# Patient Record
Sex: FEMALE | Race: BLACK OR AFRICAN AMERICAN | Employment: UNEMPLOYED | URBAN - METROPOLITAN AREA
[De-identification: names, ages, dates, MRNs, and addresses within clinical notes are randomized per-mention and may not be internally consistent; named-entity substitution may affect disease eponyms.]

---

## 2017-03-17 LAB — PAP SMEAR: NORMAL

## 2018-01-30 ENCOUNTER — OFFICE VISIT (OUTPATIENT)
Dept: FAMILY MEDICINE CLINIC | Age: 33
End: 2018-01-30

## 2018-01-30 VITALS
RESPIRATION RATE: 16 BRPM | HEIGHT: 62 IN | BODY MASS INDEX: 37.17 KG/M2 | DIASTOLIC BLOOD PRESSURE: 80 MMHG | WEIGHT: 202 LBS | HEART RATE: 68 BPM | TEMPERATURE: 98.4 F | SYSTOLIC BLOOD PRESSURE: 118 MMHG

## 2018-01-30 DIAGNOSIS — R68.89 COLD INTOLERANCE: Primary | ICD-10-CM

## 2018-01-30 DIAGNOSIS — Z00.00 WELL ADULT HEALTH CHECK: ICD-10-CM

## 2018-01-30 DIAGNOSIS — G43.009 MIGRAINE WITHOUT AURA AND WITHOUT STATUS MIGRAINOSUS, NOT INTRACTABLE: ICD-10-CM

## 2018-01-30 DIAGNOSIS — H55.00 NYSTAGMUS: ICD-10-CM

## 2018-01-30 DIAGNOSIS — R63.5 WEIGHT GAIN: ICD-10-CM

## 2018-01-30 PROBLEM — R73.9 HYPERGLYCEMIA: Status: ACTIVE | Noted: 2018-01-30

## 2018-01-30 PROBLEM — R73.9 HYPERGLYCEMIA: Status: RESOLVED | Noted: 2018-01-30 | Resolved: 2018-01-30

## 2018-01-30 PROCEDURE — G8484 FLU IMMUNIZE NO ADMIN: HCPCS | Performed by: FAMILY MEDICINE

## 2018-01-30 PROCEDURE — 99395 PREV VISIT EST AGE 18-39: CPT | Performed by: FAMILY MEDICINE

## 2018-01-30 PROCEDURE — G8417 CALC BMI ABV UP PARAM F/U: HCPCS | Performed by: FAMILY MEDICINE

## 2018-01-30 PROCEDURE — 1036F TOBACCO NON-USER: CPT | Performed by: FAMILY MEDICINE

## 2018-01-30 PROCEDURE — G8427 DOCREV CUR MEDS BY ELIG CLIN: HCPCS | Performed by: FAMILY MEDICINE

## 2018-01-30 ASSESSMENT — PATIENT HEALTH QUESTIONNAIRE - PHQ9
SUM OF ALL RESPONSES TO PHQ9 QUESTIONS 1 & 2: 1
2. FEELING DOWN, DEPRESSED OR HOPELESS: 1
SUM OF ALL RESPONSES TO PHQ QUESTIONS 1-9: 1
1. LITTLE INTEREST OR PLEASURE IN DOING THINGS: 0

## 2018-01-30 NOTE — PROGRESS NOTES
08/28/2016    MCV 81.8 08/28/2016     08/28/2016     Lab Results   Component Value Date    ALT 11 08/27/2016    AST 11 (L) 08/27/2016    ALKPHOS 106 08/27/2016    BILITOT <0.2 08/27/2016     TSH (uIU/mL)   Date Value   04/29/2015 0.83     No results found for: LABA1C  No results found for: PSA, PSADIA  The ASCVD Risk score (Jackson Orellana., et al., 2013) failed to calculate for the following reasons: The 2013 ASCVD risk score is only valid for ages 36 to 78    PHYSICAL-VISIT NOTE   Subjective:     Chief Complaint   Patient presents with    Annual Exam       Johana Cedeño is a 29 y.o. female who presents for annual testing/preventive review and cproblems below:     5 months post-partum    Low back pain many years  Still R ankle pain, worse when get to standing , intermittent during day, accident 12/16, swells at times, no falls; tylenol and ibu no help so not taking    Cold intolerance, gradually worsening, weight gain since delivery  Menses regular    Spot on R trunk couple weeks, sore      Review of Systems  A comprehensive review of systems was negative with the EXCEPTION of notes above. See scanned in system review check-list.    Patient's medications, allergies, past medical, surgical, social and family histories were reviewed and updated as appropriate (see above). Objective:   PHYSICAL EXAM   /80 (Site: Left Arm, Position: Sitting, Cuff Size: Thigh)   Pulse 68   Temp 98.4 °F (36.9 °C) (Oral)   Resp 16   Ht 5' 2\" (1.575 m)   Wt 202 lb (91.6 kg)   LMP 01/13/2018   BMI 36.95 kg/m²   Blood pressure is Excellent. BP Readings from Last 5 Encounters:   01/30/18 118/80   09/13/16 (!) 147/94   09/01/16 152/90   12/24/15 101/67   09/22/15 128/79     Weight is increased.    Wt Readings from Last 5 Encounters:   01/30/18 202 lb (91.6 kg)   09/13/16 152 lb 1.9 oz (69 kg)   09/01/16 164 lb 3.9 oz (74.5 kg)   12/13/15 187 lb 6.3 oz (85 kg)   09/22/15 160 lb (72.6 kg)      GENERAL: · well-developed, well-nourished, alert, no distress. EYES:   · External findings: lids and lashes normal and conjunctivae and sclerae normal  · Eyes: no periorbital cellulitis. ENT:   · External nose and ears appear normal  · normal TM's and external ear canals both ears  · Pharynx: normal. Exudates: None  · Lips, mucosa, and tongue normal   · Hearing grossly normal.     NECK:   · No adenopathy, supple, symmetrical, trachea midline  · Thyroid not enlarged, symmetric, no tenderness/mass/nodules  LYMPH:  · no cervical nodes, no supraclavicular nodes  LUNGS:    · Breathing unlabored  · clear to auscultation bilaterally and good air movement  CARDIOVASC:   · regular rate and rhythm, S1, S2 normal. No murmur, click, rub or gallop  · Apical impulse normal  · LEGS:  Lower extremity edema: none    · No carotid bruits  ABDOMEN:   · Soft, non-tender, no masses  · No hepatosplenomegaly  · No hernias noted. Exam limited by N/A  SKIN: warm and dry  · No rashes or suspicious lesions  · R side with filiaform 5 mm lesion- wart vs keratoacanthoma  PSYCH:    · Alert and oriented  · Normal reasoning, insight good  · Facial expressions full, mood appropriate  · No memory disturbance noted  MUSCULOSKEL:    · Gait normal, assistive device: none  · No significant finger or nail findings  · Spine symmetric, no deformities, no kyphosis      Assessment and Plan:     1. Well adult health check     2. Normocytic anemia       RISK FACTORS AND COUNSELLING  · Behavioral Risks- At risk for nutritional issues. Counseling provided on the following healthy behaviors: nutrition. INSTRUCTIONS  · NEXT APPOINTMENT: soon to remove wart  · PLEASE TAKE THIS FORM TO CHECK-OUT WINDOW TO SCHEDULE NEXT VISIT.   · REFILL POLICY:  If not getting refills today, then PLEASE make next appointment on way out today. Will need to see that future appointment scheudled when pharmacy contacts Dr. Evon Burgos for a refill.   · PLEASE GET BLOODWORK DRAWN TODAY ON FIRST FLOOR in 170. Take orders with you. RESULTS- most blood tests back in couple days. We will call you if any problems. If bloodwork good, you will get letter in mail or notified thru [address] (if signed up) within 2 weeks. If you do not, please call office.    · See orthopedics if ankle gets worse  ·

## 2018-01-30 NOTE — PATIENT INSTRUCTIONS
loss medicines? Taking medicines may work better than diet and exercise alone for some people. OTC orlistat (brand: Osker Boas) can help weight loss. A multivitamin must be taken every day to prevent vitamin deficiencies. Many people regain weight after they stop taking these medicines. Should I have weight loss surgery? Surgery can help with long-term weight loss maintenance, BUT people who make major lifestyle changes can get the same results.      Look into cell phone thomas \"Lose it\" or online at [email or web address].

## 2018-02-14 ENCOUNTER — OFFICE VISIT (OUTPATIENT)
Dept: FAMILY MEDICINE CLINIC | Age: 33
End: 2018-02-14

## 2018-02-14 VITALS
WEIGHT: 202 LBS | HEART RATE: 88 BPM | BODY MASS INDEX: 37.17 KG/M2 | TEMPERATURE: 98.1 F | SYSTOLIC BLOOD PRESSURE: 110 MMHG | DIASTOLIC BLOOD PRESSURE: 70 MMHG | RESPIRATION RATE: 16 BRPM | HEIGHT: 62 IN

## 2018-02-14 DIAGNOSIS — D48.9 NEOPLASM OF UNCERTAIN BEHAVIOR: Primary | ICD-10-CM

## 2018-02-14 PROCEDURE — 11100 PR BIOPSY OF SKIN LESION: CPT | Performed by: FAMILY MEDICINE

## 2018-02-14 NOTE — PATIENT INSTRUCTIONS
Plan:  Keep covered for 24-48h and then clean and dry. Apply topical antibiotics for a few days. Call for any signs of infection including redness, swelling or increasing pain. May take OTC pain relivers if needed. Expect a call about results within 7 days. Please call if you do not hear anything by then.

## 2018-02-15 ENCOUNTER — TELEPHONE (OUTPATIENT)
Dept: FAMILY MEDICINE CLINIC | Age: 33
End: 2018-02-15

## 2018-02-15 NOTE — TELEPHONE ENCOUNTER
Debbie with [facility] lab called and needs to know the source of the biopsy that was ordered.     Please call

## 2018-03-01 ENCOUNTER — TELEPHONE (OUTPATIENT)
Dept: FAMILY MEDICINE CLINIC | Age: 33
End: 2018-03-01

## 2018-03-01 NOTE — TELEPHONE ENCOUNTER
Pt is calling to see if Dr. Monika Gastelum would prescribed Adepx for weight loss.      Please advise

## 2018-03-01 NOTE — TELEPHONE ENCOUNTER
Pt was very rude and stated that it doesn't matter, she's going to go buy it anyway, she just was hoping to get it cheaper with a prescription. She said she has a lot of kids and was hoping Dr. Aguila Eric would understand cost and hung up on me.

## 2019-12-09 ENCOUNTER — TELEPHONE (OUTPATIENT)
Dept: FAMILY MEDICINE CLINIC | Age: 34
End: 2019-12-09

## 2021-01-27 ENCOUNTER — TELEPHONE (OUTPATIENT)
Dept: FAMILY MEDICINE CLINIC | Age: 36
End: 2021-01-27

## 2021-03-11 ENCOUNTER — OFFICE VISIT (OUTPATIENT)
Dept: FAMILY MEDICINE CLINIC | Age: 36
End: 2021-03-11
Payer: COMMERCIAL

## 2021-03-11 VITALS
WEIGHT: 154.38 LBS | TEMPERATURE: 97.3 F | RESPIRATION RATE: 16 BRPM | DIASTOLIC BLOOD PRESSURE: 70 MMHG | OXYGEN SATURATION: 98 % | HEIGHT: 62 IN | HEART RATE: 75 BPM | SYSTOLIC BLOOD PRESSURE: 124 MMHG | BODY MASS INDEX: 28.41 KG/M2

## 2021-03-11 DIAGNOSIS — L50.9 URTICARIA: ICD-10-CM

## 2021-03-11 DIAGNOSIS — G43.009 MIGRAINE WITHOUT AURA AND WITHOUT STATUS MIGRAINOSUS, NOT INTRACTABLE: ICD-10-CM

## 2021-03-11 DIAGNOSIS — Z00.00 WELL ADULT HEALTH CHECK: Primary | ICD-10-CM

## 2021-03-11 PROCEDURE — 96372 THER/PROPH/DIAG INJ SC/IM: CPT | Performed by: FAMILY MEDICINE

## 2021-03-11 PROCEDURE — 99395 PREV VISIT EST AGE 18-39: CPT | Performed by: FAMILY MEDICINE

## 2021-03-11 PROCEDURE — G8484 FLU IMMUNIZE NO ADMIN: HCPCS | Performed by: FAMILY MEDICINE

## 2021-03-11 RX ORDER — DIPHENHYDRAMINE HYDROCHLORIDE 50 MG/ML
25 INJECTION INTRAMUSCULAR; INTRAVENOUS ONCE
Status: COMPLETED | OUTPATIENT
Start: 2021-03-11 | End: 2021-03-11

## 2021-03-11 RX ORDER — PREDNISOLONE SODIUM PHOSPHATE 5 MG/5ML
10 SOLUTION ORAL 3 TIMES DAILY
Qty: 60 ML | Refills: 0 | Status: SHIPPED | OUTPATIENT
Start: 2021-03-11 | End: 2021-03-13

## 2021-03-11 RX ADMIN — DIPHENHYDRAMINE HYDROCHLORIDE 25 MG: 50 INJECTION INTRAMUSCULAR; INTRAVENOUS at 15:32

## 2021-03-11 SDOH — ECONOMIC STABILITY: INCOME INSECURITY: HOW HARD IS IT FOR YOU TO PAY FOR THE VERY BASICS LIKE FOOD, HOUSING, MEDICAL CARE, AND HEATING?: NOT HARD AT ALL

## 2021-03-11 SDOH — ECONOMIC STABILITY: TRANSPORTATION INSECURITY
IN THE PAST 12 MONTHS, HAS LACK OF TRANSPORTATION KEPT YOU FROM MEETINGS, WORK, OR FROM GETTING THINGS NEEDED FOR DAILY LIVING?: NO

## 2021-03-11 SDOH — ECONOMIC STABILITY: TRANSPORTATION INSECURITY
IN THE PAST 12 MONTHS, HAS THE LACK OF TRANSPORTATION KEPT YOU FROM MEDICAL APPOINTMENTS OR FROM GETTING MEDICATIONS?: NO

## 2021-03-11 ASSESSMENT — PATIENT HEALTH QUESTIONNAIRE - PHQ9
2. FEELING DOWN, DEPRESSED OR HOPELESS: 0
1. LITTLE INTEREST OR PLEASURE IN DOING THINGS: 0

## 2021-03-11 NOTE — LETTER
[facility]  [address] [place] [address]  Phone: [phone]  Fax: [phone]    Queen Mari MD        March 11, 2021     Patient: Ellis Jovel   YOB: 1985   Date of Visit: 3/11/2021       To Whom It May Concern:    Ellis Jovel was seen in our office today 03/11/2021    If you have any questions or concerns, please don't hesitate to call.     Sincerely,          Queen Mari MD

## 2021-03-11 NOTE — PROGRESS NOTES
PHYSICAL-VISIT NOTE   Subjective:     Chief Complaint   Patient presents with    Annual Exam       Howard Ferrer is a 37 y.o. female who presents for annual testing/preventive review and check-up of medical problems listed below:  1. Well adult health check    2. Urticaria    3. Migraine without aura and without status migrainosus, not intractable       Complaints: forearms and elbows itchy and bumpy since came into exam room. No new exposures. Had similar 10 yrs ago all over body with no known trigger. None since. Denies lip/throat swelling or itch. · Trouble with vision worsening. · Migraines every few days the last couple weeks. Better with ibu and sleep. Positive photophobia. Denies N. Lasts couple hours. ROS  See scanned \"Annual Adult Health Checklist\". Pertinent positives addressed above. HISTORY:  Patient's medications, allergies, past medical, and social histories were reviewed and updated as appropriate. CHART REVIEW  Health Maintenance   Topic Date Due    Cervical cancer screen  03/17/2020    Flu vaccine (1) 06/30/2021 (Originally 9/1/2020)    DTaP/Tdap/Td vaccine (5 - Td) 05/02/2029    HIV screen  Completed    Hepatitis A vaccine  Aged Out    Hepatitis B vaccine  Aged Out    Hib vaccine  Aged Out    Meningococcal (ACWY) vaccine  Aged Out    Pneumococcal 0-64 years Vaccine  Aged Out    Varicella vaccine  Discontinued    Hepatitis C screen  Discontinued     The ASCVD Risk score (La Wiley., et al., 2013) failed to calculate for the following reasons: The 2013 ASCVD risk score is only valid for ages 36 to 78  Prior to Visit Medications    Medication Sig Taking? Authorizing Provider   prednisoLONE sodium phosphate (PEDIAPRED) 6.7 (5 Base) MG/5ML SOLN solution Take 10 mLs by mouth 3 times daily for 2 days Yes Lisa Hebert MD      Family History   Problem Relation Age of Onset    Diabetes Father     Migraines Father     Cancer Maternal Aunt         ??      Social History     Tobacco Use    Smoking status: Never Smoker    Smokeless tobacco: Never Used    Tobacco comment: congrats   Substance Use Topics    Alcohol use: Yes     Alcohol/week: 2.0 standard drinks     Types: 2 Standard drinks or equivalent per week    Drug use: No      LAST LABS  Cholesterol, Total   Date Value Ref Range Status   04/29/2015 202 (H) 0 - 199 mg/dL Final     LDL Calculated   Date Value Ref Range Status   04/29/2015 108 (H) <100 mg/dL Final     HDL   Date Value Ref Range Status   04/29/2015 81 (H) 40 - 60 mg/dL Final     Triglycerides   Date Value Ref Range Status   04/29/2015 67 0 - 150 mg/dL Final     Lab Results   Component Value Date    GLUCOSE 132 (H) 08/30/2016     Lab Results   Component Value Date     08/30/2016    K 4.1 08/30/2016    CREATININE 0.6 08/30/2016     Lab Results   Component Value Date    WBC 10.6 08/28/2016    HGB 11.6 (L) 08/28/2016    HCT 36.1 08/28/2016    MCV 81.8 08/28/2016     08/28/2016     Lab Results   Component Value Date    ALT 11 08/27/2016    AST 11 (L) 08/27/2016    ALKPHOS 106 08/27/2016    BILITOT <0.2 08/27/2016     TSH (uIU/mL)   Date Value   04/29/2015 0.83     No results found for: LABA1C  Objective:   PHYSICAL EXAM   /70 (Site: Right Upper Arm, Position: Sitting, Cuff Size: Medium Adult)   Pulse 75   Temp 97.3 °F (36.3 °C) (Temporal)   Resp 16   Ht 5' 1.5\" (1.562 m)   Wt 154 lb 6 oz (70 kg)   SpO2 98%   BMI 28.70 kg/m²   BP Readings from Last 5 Encounters:   03/11/21 124/70   02/14/18 110/70   01/30/18 118/80   09/13/16 (!) 147/94   09/01/16 152/90     Wt Readings from Last 5 Encounters:   03/11/21 154 lb 6 oz (70 kg)   02/14/18 202 lb (91.6 kg)   01/30/18 202 lb (91.6 kg)   09/13/16 152 lb 1.9 oz (69 kg)   09/01/16 164 lb 3.9 oz (74.5 kg)      GENERAL:   · well-developed, well-nourished, alert, no distress.      EYES:   · External findings: lids and lashes normal and conjunctivae and sclerae normal  · Eyes: no periorbital cellulitis. ENT:   · External nose and ears appear normal  · normal TM's and external ear canals both ears  · Pharynx: normal. Exudates: None  · Lips, mucosa, and tongue normal  · Hearing grossly normal.     NECK:   · Supple, symmetrical, trachea midline  · Thyroid not enlarged, symmetric, no tenderness/mass/nodules  LYMPH:  · no cervical nodes, no supraclavicular nodes  LUNGS:    · Breathing unlabored  · clear to auscultation bilaterally and good air movement  CARDIOVASC:   · regular rate and rhythm, S1, S2 normal. No murmur, click, rub or gallop  · Apical impulse normal  · LEGS:  Lower extremity edema: none    ABDOMEN:   · Soft, non-tender, no masses  · No hepatosplenomegaly  · No hernias noted. Exam limited by N/A  SKIN: warm and dry  · No rashes or suspicious lesions  · No nodules or induration  PSYCH:    · Alert and oriented  · Normal reasoning, insight good  · Facial expressions full, mood appropriate  · No memory disturbance noted  MUSCULOSKEL:    · Gait normal, assistive device: none  · No significant finger or nail findings  · Spine symmetric, no deformities, no kyphosis      Assessment and Plan:      Diagnosis Orders   1. Well adult health check  Lipid Panel    Glucose   2. Urticaria  diphenhydrAMINE (BENADRYL) injection 25 mg    prednisoLONE sodium phosphate (PEDIAPRED) 6.7 (5 Base) MG/5ML SOLN solution   3. Migraine without aura and without status migrainosus, not intractable     Plan as above and below. Given benadryl 25 mg IM x 1    INSTRUCTIONS  · NEXT APPOINTMENT: Please schedule annual complete physical (30 minutes) in one year. · PLEASE TAKE THIS FORM TO CHECK-OUT WINDOW TO SCHEDULE NEXT VISIT. PLEASE GET FASTING BLOODWORK DRAWN SOON. Lab is on first floor in [address]. Hours Monday to Friday 7 AM to 5 PM.   · See eye doctor soon. · Take prednisone for 2 days. · Get some benadryl at pharmacy. · Get more sleep. · Ask GYN to fax PAP result to Dr. Nile Ordaz at [phone].   · See me 1-2 months if migraines not settling down.

## 2021-03-11 NOTE — PATIENT INSTRUCTIONS
INSTRUCTIONS  · NEXT APPOINTMENT: Please schedule annual complete physical (30 minutes) in one year. · PLEASE TAKE THIS FORM TO CHECK-OUT WINDOW TO SCHEDULE NEXT VISIT. PLEASE GET FASTING BLOODWORK DRAWN SOON. Lab is on first floor in [address]. Hours Monday to Friday 7 AM to 5 PM.   · See eye doctor soon. · Take prednisone for 2 days. · Get some benadryl at pharmacy. · Get more sleep. · Ask GYN to fax PAP result to Dr. Nile Ordaz at [phone]. · See me 1-2 months if migraines not settling down. Patient Education       MIGRAINES    What is a migraine headache? A migraine is usually an intense pounding headache that can last for 2 hours or even up to 2 or 3 days. The pounding or pulsing pain usually begins in the forehead, the side of the head or around the eyes. The headache gradually gets worse. Just about any movement, activity, bright lights or loud noises seem to make it hurt more. Nausea and vomiting are common. Some people see a pattern of lines or shadows in front of their eyes as the headache is beginning. This is called a \"warning aura. \" Most people who have migraine headaches do not have this. Migraines may happen only once or twice a year, or as often as daily. Women are more likely to have migraines than men. As many as 5% of children in grade school have migraine headaches. More common in girls than in boys. Classic migraines start with a warning sign, called an aura. The aura often involves changes in the way you see. You may see flashing lights and colors. You may temporarily lose some of your vision, such as your side vision. You may also feel a strange prickly or burning sensation, or have muscle weakness on one side of your body. You may have trouble communicating. You may also feel depressed, irritable and restless. Auras last about 15 to 30 minutes. Auras may occur before or after your head pain, and sometimes the pain and aura overlap, or the pain never occurs.  The head pain of classic migraines may occur on one side of your head or on both sides. Common migraines don't start with an aura. Common migraines may start more slowly than classic migraines, last longer and interfere more with daily activities. The pain of common migraines may be on only one side of your head. Symptoms   Pain can be intense. It can get in the way of your daily activities. Migraines aren't the same for all people. Possible symptoms include:  intense throbbing or dull aching pain on one side of your head or both sides, worsens with physical activity, nausea or vomiting, blurred vision or blind spots, sensitivity to light, noise or odors, feeling tired and/or confused, stopped-up nose, feeling cold or sweaty, stiff or tender neck, lightheadedness, tender scalp. You may have a \"premonition\" several hours to a day before your headache starts. Premonitions are feelings you get that can signal a migraine is coming. These feelings can include intense energy, fatigue, food cravings, thirst and mood changes. What causes migraines? Migraine headaches seem to be caused in part by changes in the level of a body chemical called serotonin. When serotonin levels fall, the blood vessels dilate (swell). This swelling can cause pain or other problems. What can set off a migraine? Things that can set off migraines include the following:  odors, bright lights or loud noises, changes in weather or altitude, feeling tired/stressed/depressed, changes in sleeping patterns,missing meals or fasting, menstrual periods or hormonal changes, intense physical activity, smoking, fumes. Many foods can trigger migraines such as:   processed meats, aged cheese, red wine, aspartame, beans, yeast, caffeine in excess, chocolate, cocoa and carob, MSG, nuts, papaya, avocado, pickled foods, raisens, sauerkraut, soy sauce, etc     Diagnosis & Tests   How is migraine diagnosed?   Your doctor can diagnose migraines by the symptoms you describe. Your doctor will perform a physical exam. Your doctor may also want to do blood tests or imaging tests, such as an MRI or CAT scan of the brain, to be sure that there are no other causes for the headache. You may also be asked to keep a \"headache diary\" to help your doctor identify the things that might cause your migraines. Treatment   How are migraines treated? There are 2 types of medicines for migraine treatments. One type focuses on relieving the headache pain. This type of treatment should be started as soon as you think you're getting a migraine. The other type includes medicines that are used to prevent headaches before they occur. Can nonprescription medicines help relieve the pain? Yes. Nonprescription medicines that can help relieve migraine pain include aspirin; acetaminophen (one brand name: Tylenol); an acetaminophen, aspirin and caffeine combination (one brand name: Excedrin Migraine); ibuprofen (one brand name: Motrin); naproxen (brand name: Aleve); and ketoprofen (brand name: Orudis KT). What about prescription medicines? People who have more severe pain may need prescription medicine. A medicine called ergotamine can be effective alone or combined with other medicines. Dihydroergotamine is related to ergotamine and can be helpful. Other prescription medicines for migraines include sumatriptan, zolmitriptan, naratriptan, rizatriptan, almotriptan, eletriptan and frovatriptan. If the pain won't go away, stronger medicine may be needed, such as a narcotic, or medicines that contain a barbiturate. These medicines can be habit-forming and should be used cautiously. Tips on reducing the pain  Lie down in a dark, quiet room. Put a cold compress or cloth over your forehead. Massage your scalp using a lot of pressure. Put pressure on your temples. Prevention   Can medicine help prevent migraines? Yes.  Medicine to prevent migraines may be helpful if your headaches happen more than twice a month or if your headaches make it hard for you to work and function. Examples of medicines used to prevent migraines include propranolol, timolol, divalproex and some antidepressants. What else can I do to prevent migraines? While there are no sure ways to keep from having migraine headaches, here are some things that may help:  Eat regularly and do not skip meals. Keep a regular sleep schedule. Exercise regularly. Aerobic exercise can help reduce tension as well as keep your weight in check. Obesity can contribute to migraines. Look for things that might trigger an attack, such as too much exercise or physical activity, certain activities or stress. Sometimes, life stresses are a trigger. Many psychologists can teach stress management and/or biofeedback to help you manage stress. Look for foods that might trigger an attack, such as cheese, processed meats, chocolate, caffeine, MSG (a preservative in many foods), nuts, pickles or one of the other foods listed above. About one third of people with migraines can identify food triggers. Your child only needs to avoid eating these foods if one of them triggers headaches. If you have frequent migraine headaches, your doctor may prescribe a daily preventive medicine to try to make the headaches less frequent and less severe.

## 2021-08-31 ENCOUNTER — TELEPHONE (OUTPATIENT)
Dept: FAMILY MEDICINE CLINIC | Age: 36
End: 2021-08-31

## 2021-08-31 NOTE — TELEPHONE ENCOUNTER
----- Message from April Arti sent at 8/31/2021 12:16 PM EDT -----  Subject: Message to Provider    QUESTIONS  Information for Provider? patient needs FMLA paperwork filled out. she was   inquiring if her primary Girard Harm can fill this out, or if she should   go to her surgeon to have surgeon fill it out. please call patient to   advise. per patient she needs to know as soon as possible if this can be   done.   ---------------------------------------------------------------------------  --------------  CALL BACK INFO  What is the best way for the office to contact you? OK to leave message on   voicemail  Preferred Call Back Phone Number? [phone]  ---------------------------------------------------------------------------  --------------  SCRIPT ANSWERS  Relationship to Patient?  Self

## 2021-08-31 NOTE — TELEPHONE ENCOUNTER
Pt calling back. Stated that the [facility] paperwork would need done by 9.2.2021 for her job from having surgery. Advised that it was noted if it is due to surgery the surgeon would need to fill out [facility] paperwork.

## 2022-08-25 ENCOUNTER — APPOINTMENT (OUTPATIENT)
Dept: GENERAL RADIOLOGY | Age: 37
End: 2022-08-25
Payer: COMMERCIAL

## 2022-08-25 ENCOUNTER — APPOINTMENT (OUTPATIENT)
Dept: MRI IMAGING | Age: 37
End: 2022-08-25
Payer: COMMERCIAL

## 2022-08-25 ENCOUNTER — APPOINTMENT (OUTPATIENT)
Dept: CT IMAGING | Age: 37
End: 2022-08-25
Payer: COMMERCIAL

## 2022-08-25 ENCOUNTER — HOSPITAL ENCOUNTER (OUTPATIENT)
Age: 37
Setting detail: OBSERVATION
Discharge: HOME | End: 2022-08-26
Attending: EMERGENCY MEDICINE | Admitting: FAMILY MEDICINE
Payer: COMMERCIAL

## 2022-08-25 DIAGNOSIS — R20.0 RIGHT SIDED NUMBNESS: Primary | ICD-10-CM

## 2022-08-25 PROBLEM — R20.2 PARESTHESIAS: Status: ACTIVE | Noted: 2022-08-25

## 2022-08-25 LAB
A/G RATIO: 1.4 (ref 1.1–2.2)
ALBUMIN SERPL-MCNC: 4.2 G/DL (ref 3.4–5)
ALP BLD-CCNC: 77 U/L (ref 40–129)
ALT SERPL-CCNC: 15 U/L (ref 10–40)
ANION GAP SERPL CALCULATED.3IONS-SCNC: 11 MMOL/L (ref 3–16)
AST SERPL-CCNC: 16 U/L (ref 15–37)
BACTERIA: ABNORMAL /HPF
BASOPHILS ABSOLUTE: 0.1 K/UL (ref 0–0.2)
BASOPHILS RELATIVE PERCENT: 0.7 %
BILIRUB SERPL-MCNC: 0.3 MG/DL (ref 0–1)
BILIRUBIN URINE: NEGATIVE
BLOOD, URINE: NEGATIVE
BUN BLDV-MCNC: 9 MG/DL (ref 7–20)
CALCIUM SERPL-MCNC: 9 MG/DL (ref 8.3–10.6)
CHLORIDE BLD-SCNC: 102 MMOL/L (ref 99–110)
CLARITY: ABNORMAL
CO2: 24 MMOL/L (ref 21–32)
COLOR: YELLOW
CREAT SERPL-MCNC: 0.6 MG/DL (ref 0.6–1.1)
D DIMER: <0.27 UG/ML FEU (ref 0–0.6)
EKG ATRIAL RATE: 66 BPM
EKG DIAGNOSIS: NORMAL
EKG P AXIS: 54 DEGREES
EKG P-R INTERVAL: 160 MS
EKG Q-T INTERVAL: 408 MS
EKG QRS DURATION: 84 MS
EKG QTC CALCULATION (BAZETT): 427 MS
EKG R AXIS: 63 DEGREES
EKG T AXIS: 42 DEGREES
EKG VENTRICULAR RATE: 66 BPM
EOSINOPHILS ABSOLUTE: 0.5 K/UL (ref 0–0.6)
EOSINOPHILS RELATIVE PERCENT: 6.2 %
EPITHELIAL CELLS, UA: 7 /HPF (ref 0–5)
GFR AFRICAN AMERICAN: >60
GFR NON-AFRICAN AMERICAN: >60
GLUCOSE BLD-MCNC: 87 MG/DL (ref 70–99)
GLUCOSE URINE: NEGATIVE MG/DL
HCG QUALITATIVE: NEGATIVE
HCT VFR BLD CALC: 37.4 % (ref 36–48)
HEMOGLOBIN: 12.6 G/DL (ref 12–16)
HYALINE CASTS: 0 /LPF (ref 0–8)
INR BLD: 0.98 (ref 0.87–1.14)
KETONES, URINE: ABNORMAL MG/DL
LEUKOCYTE ESTERASE, URINE: ABNORMAL
LYMPHOCYTES ABSOLUTE: 2.9 K/UL (ref 1–5.1)
LYMPHOCYTES RELATIVE PERCENT: 33.9 %
MAGNESIUM: 1.8 MG/DL (ref 1.8–2.4)
MCH RBC QN AUTO: 29.4 PG (ref 26–34)
MCHC RBC AUTO-ENTMCNC: 33.8 G/DL (ref 31–36)
MCV RBC AUTO: 87.2 FL (ref 80–100)
MICROSCOPIC EXAMINATION: YES
MONOCYTES ABSOLUTE: 0.8 K/UL (ref 0–1.3)
MONOCYTES RELATIVE PERCENT: 9.8 %
NEUTROPHILS ABSOLUTE: 4.2 K/UL (ref 1.7–7.7)
NEUTROPHILS RELATIVE PERCENT: 49.4 %
NITRITE, URINE: NEGATIVE
PDW BLD-RTO: 13.2 % (ref 12.4–15.4)
PH UA: 7 (ref 5–8)
PLATELET # BLD: 376 K/UL (ref 135–450)
PMV BLD AUTO: 7.6 FL (ref 5–10.5)
POTASSIUM REFLEX MAGNESIUM: 3.5 MMOL/L (ref 3.5–5.1)
PROTEIN UA: NEGATIVE MG/DL
PROTHROMBIN TIME: 12.9 SEC (ref 11.7–14.5)
RBC # BLD: 4.29 M/UL (ref 4–5.2)
RBC UA: 2 /HPF (ref 0–4)
SODIUM BLD-SCNC: 137 MMOL/L (ref 136–145)
SPECIFIC GRAVITY UA: 1.03 (ref 1–1.03)
TOTAL PROTEIN: 7.2 G/DL (ref 6.4–8.2)
TROPONIN: <0.01 NG/ML
URINE REFLEX TO CULTURE: YES
URINE TYPE: ABNORMAL
UROBILINOGEN, URINE: 1 E.U./DL
WBC # BLD: 8.4 K/UL (ref 4–11)
WBC UA: 23 /HPF (ref 0–5)

## 2022-08-25 PROCEDURE — 83735 ASSAY OF MAGNESIUM: CPT

## 2022-08-25 PROCEDURE — 6360000002 HC RX W HCPCS: Performed by: FAMILY MEDICINE

## 2022-08-25 PROCEDURE — 81001 URINALYSIS AUTO W/SCOPE: CPT

## 2022-08-25 PROCEDURE — 6370000000 HC RX 637 (ALT 250 FOR IP): Performed by: FAMILY MEDICINE

## 2022-08-25 PROCEDURE — 99285 EMERGENCY DEPT VISIT HI MDM: CPT

## 2022-08-25 PROCEDURE — 6360000004 HC RX CONTRAST MEDICATION: Performed by: FAMILY MEDICINE

## 2022-08-25 PROCEDURE — A9577 INJ MULTIHANCE: HCPCS | Performed by: FAMILY MEDICINE

## 2022-08-25 PROCEDURE — 87086 URINE CULTURE/COLONY COUNT: CPT

## 2022-08-25 PROCEDURE — G0378 HOSPITAL OBSERVATION PER HR: HCPCS

## 2022-08-25 PROCEDURE — 85610 PROTHROMBIN TIME: CPT

## 2022-08-25 PROCEDURE — 84703 CHORIONIC GONADOTROPIN ASSAY: CPT

## 2022-08-25 PROCEDURE — 93005 ELECTROCARDIOGRAM TRACING: CPT | Performed by: EMERGENCY MEDICINE

## 2022-08-25 PROCEDURE — 84484 ASSAY OF TROPONIN QUANT: CPT

## 2022-08-25 PROCEDURE — 70551 MRI BRAIN STEM W/O DYE: CPT

## 2022-08-25 PROCEDURE — 85379 FIBRIN DEGRADATION QUANT: CPT

## 2022-08-25 PROCEDURE — 72156 MRI NECK SPINE W/O & W/DYE: CPT

## 2022-08-25 PROCEDURE — 85025 COMPLETE CBC W/AUTO DIFF WBC: CPT

## 2022-08-25 PROCEDURE — 6360000004 HC RX CONTRAST MEDICATION: Performed by: EMERGENCY MEDICINE

## 2022-08-25 PROCEDURE — 70496 CT ANGIOGRAPHY HEAD: CPT

## 2022-08-25 PROCEDURE — 93010 ELECTROCARDIOGRAM REPORT: CPT | Performed by: INTERNAL MEDICINE

## 2022-08-25 PROCEDURE — 96372 THER/PROPH/DIAG INJ SC/IM: CPT

## 2022-08-25 PROCEDURE — 70450 CT HEAD/BRAIN W/O DYE: CPT

## 2022-08-25 PROCEDURE — 80053 COMPREHEN METABOLIC PANEL: CPT

## 2022-08-25 PROCEDURE — 92610 EVALUATE SWALLOWING FUNCTION: CPT

## 2022-08-25 PROCEDURE — 71045 X-RAY EXAM CHEST 1 VIEW: CPT

## 2022-08-25 RX ORDER — ATORVASTATIN CALCIUM 80 MG/1
80 TABLET, FILM COATED ORAL NIGHTLY
Status: DISCONTINUED | OUTPATIENT
Start: 2022-08-25 | End: 2022-08-26 | Stop reason: HOSPADM

## 2022-08-25 RX ORDER — ACETAMINOPHEN 500 MG
500 TABLET ORAL EVERY 6 HOURS PRN
COMMUNITY

## 2022-08-25 RX ORDER — ONDANSETRON 4 MG/1
4 TABLET, ORALLY DISINTEGRATING ORAL EVERY 8 HOURS PRN
Status: DISCONTINUED | OUTPATIENT
Start: 2022-08-25 | End: 2022-08-26 | Stop reason: HOSPADM

## 2022-08-25 RX ORDER — POLYETHYLENE GLYCOL 3350 17 G/17G
17 POWDER, FOR SOLUTION ORAL DAILY PRN
Status: DISCONTINUED | OUTPATIENT
Start: 2022-08-25 | End: 2022-08-26 | Stop reason: HOSPADM

## 2022-08-25 RX ORDER — NAPROXEN SODIUM 220 MG
220 TABLET ORAL 2 TIMES DAILY PRN
COMMUNITY
End: 2022-10-07

## 2022-08-25 RX ORDER — ASPIRIN 300 MG/1
300 SUPPOSITORY RECTAL DAILY
Status: DISCONTINUED | OUTPATIENT
Start: 2022-08-25 | End: 2022-08-26 | Stop reason: HOSPADM

## 2022-08-25 RX ORDER — ENOXAPARIN SODIUM 100 MG/ML
40 INJECTION SUBCUTANEOUS EVERY EVENING
Status: DISCONTINUED | OUTPATIENT
Start: 2022-08-25 | End: 2022-08-26 | Stop reason: HOSPADM

## 2022-08-25 RX ORDER — ASPIRIN 81 MG/1
81 TABLET ORAL DAILY
Status: DISCONTINUED | OUTPATIENT
Start: 2022-08-25 | End: 2022-08-26 | Stop reason: HOSPADM

## 2022-08-25 RX ORDER — ONDANSETRON 2 MG/ML
4 INJECTION INTRAMUSCULAR; INTRAVENOUS EVERY 6 HOURS PRN
Status: DISCONTINUED | OUTPATIENT
Start: 2022-08-25 | End: 2022-08-26 | Stop reason: HOSPADM

## 2022-08-25 RX ORDER — ACETAMINOPHEN, ASPIRIN AND CAFFEINE 250; 250; 65 MG/1; MG/1; MG/1
1 TABLET, FILM COATED ORAL EVERY 6 HOURS PRN
COMMUNITY

## 2022-08-25 RX ADMIN — IOPAMIDOL 75 ML: 755 INJECTION, SOLUTION INTRAVENOUS at 05:04

## 2022-08-25 RX ADMIN — ENOXAPARIN SODIUM 40 MG: 100 INJECTION SUBCUTANEOUS at 18:17

## 2022-08-25 RX ADMIN — ASPIRIN 81 MG: 81 TABLET, COATED ORAL at 14:07

## 2022-08-25 RX ADMIN — GADOBENATE DIMEGLUMINE 14 ML: 529 INJECTION, SOLUTION INTRAVENOUS at 15:29

## 2022-08-25 RX ADMIN — ATORVASTATIN CALCIUM 80 MG: 80 TABLET, FILM COATED ORAL at 21:33

## 2022-08-25 ASSESSMENT — PAIN SCALES - GENERAL
PAINLEVEL_OUTOF10: 0
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
## 2022-08-25 NOTE — ED NOTES
ED SBAR report provider to [facility]. Patient to be transported to Room 5257 via stretcher by ED tech. Patient transported with bedside cardiac monitor and with IV medications infusing. IV site clean, dry, and intact. MEWS score and pain assessed and documented. Updated patient on plan of care.      Loreto Noonan RN  08/25/22 5594
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
## 2022-08-25 NOTE — PROGRESS NOTES
Speech Language Pathology    Attempted to see patient for evaluation. Patient currently with neuro. Will re-attempt as schedule permits.     Fany Price, [facility], [address] [place] [address]  Speech-Language Pathologist  On 08/25/22 at 1:18 PM
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
## 2022-08-25 NOTE — PROGRESS NOTES
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     CT head 8/25/22  Impression   1. No acute hemorrhage. 2. Minimal white matter low attenuation in an area of the right posterior   parietal lobe, better seen on previous MR imaging in 2016. The extent of   white matter findings are better appreciated on MR imaging than can be seen   on CT. CHEST X-RAY 8/25/22  Impression   Clear chest without acute cardiopulmonary process. Modified Barium Swallow Study: any recent history in chart review: None in EMR    Reason for referral: SLP evaluation orders received due to CVA protocol     History/Prior Level of Function:   Living Status: Lives with kids  Baseline diet: Regular, thin liquids  Prior Dysphagia History: Denies, none in EMR    Dysphagia Impressions/Diagnosis: Oropharyngeal Dysphagia   OROPHARYNGEAL DYSPHAGIA DIAGNOSIS: WFL  Patient accepted and tolerated evaluation at bedside  Patient oriented x4, able to follow complex dx and verbally responsive. Pt denies cognitive decline  Patient presents with swallow function WFL, no dysphagia noted. Adequate mastication, clearance of bolus, timely swallow initiation, adequate laryngeal elevation and tolerated no overt s/s of aspiration or penetration with all trials presented  Recommend cont reg/thin liquids  ST to sign off at this time, no further ST warranted. Please re-consult if future need arises    Recommended Diet and Intervention 8/25/2022:  Diet Solids Recommendation:  Regular texture diet  Liquid Consistency Recommendation: Thin liquids  Recommended form of Meds: Whole with water     Dysphagia Prognosis: [x] good []fair  []guarded []poor     Discharge Recommendations: No further follow-up appears indicated at this time. TEST DATA  Vision: [facility]  Hearing: WFL    Consistencies Presented:    Thin liquid;   Soft/bite size food  Regular solid food    Cognitive/behavioral:   []orientation [x] to self [x] place [x] date [x] reason for admit [x] purpose of evaluation  []distractible  []verbally responsive  []follows one step commands  []agitated  []impulsive  [] other:       Patient Positioning: Upright in bed     Dentition:  [x]Adequate  []Dentures   []Missing Many Teeth  []Edentulous  []Other:    Baseline Vocal Quality:  [x]Normal  []Dysphonic   []Aphonic   []Hoarse  []Wet  []Weak  []Other:    Volitional Cough:  []Strong  []Weak  []Wet  []Absent  []Congested  []Other:    Volitional Swallow:   []Absent   []Delayed     [x]Adequate     []Required use of drink     Oral Mechanism Exam:  [x]WFL []Mild   [] Moderate  []Severe  []To be assessed  Impaired:   []Left side      []Right side    []Labial ROM/Coordination    []Labial Symmetry   []Lingual ROM/Coordination   []Lingual Symmetry  []Gag  []Other:     Oral Phase: [x]WFL []Mild   [] Moderate  []Severe  []To be assessed   []Impaired/Prolonged Mastication:   []Spillage Left:   []Spillage Right:  []Pocketing Left:   []Pocketing Right:   []Decreased Anterior to Posterior Transit:   []Suspected Premature Bolus Loss:   []Lingual/Palatal Residue:   []Other:     Pharyngeal Phase: [x]WFL []Mild   [] Moderate  []Severe  []To be assessed   []Delayed Swallow:   []Suspected Pharyngeal Pooling:   []Decreased Laryngeal Elevation:   []Absent Swallow:  []Wet Vocal Quality:   []Throat Clearing-Immediate:   []Throat Clearing-Delayed:   []Cough-Immediate:   []Cough-Delayed:  []Change in Vital Signs:  []Suspected Delayed Pharyngeal Clearing:  []Other:       Eating Assistance:  [x]Independent  []Setup or clean-up assistance   [] Supervision or touching assistance   [] Partial or moderate assistance   [] Substantial or maximal assistance  [] Dependent       EDUCATION:   Provided education regarding role of SLP, results of assessment, recommendations and general speech pathology plan of care.    [x] Pt verbalized understanding and agreement   [] Pt requires ongoing learning   [] No evidence of comprehension     If patient discharges prior to next visit, this note will serve as discharge.      Timed Code Minutes: 0  Total Treatment Minutes: 15    Electronically signed by:    Prasad Fabian, [address] [place] [address]  Speech-Language Pathologist  On 08/25/22 at 1:57 PM

## 2022-08-25 NOTE — PROGRESS NOTES
Pharmacy Medication History Note    List of current medications patient is taking is complete. Source of Information:   1. Patient interview          Medication Notes:  1. Patient take no routine medications  2. Added PRN pain medications  3. No MVI, herbals or OTC medications.      Allergies clarified:  Not reviewed    Jack Min [facility], PRS 8/25/2022  10:19 AM

## 2022-08-25 NOTE — PLAN OF CARE
Problem: Discharge Planning  Goal: Discharge to home or other facility with appropriate resources  Outcome: Progressing  Flowsheets (Taken 8/25/2022 6502)  Discharge to home or other facility with appropriate resources: Identify barriers to discharge with patient and caregiver     Problem: Pain  Goal: Verbalizes/displays adequate comfort level or baseline comfort level  Outcome: Progressing

## 2022-08-25 NOTE — ED NOTES
Pt ambulatory to and from restroom without difficulty with slow steady gait.       Colleen Bajwa RN  08/25/22 6550

## 2022-08-26 VITALS
DIASTOLIC BLOOD PRESSURE: 85 MMHG | OXYGEN SATURATION: 99 % | BODY MASS INDEX: 29.17 KG/M2 | SYSTOLIC BLOOD PRESSURE: 142 MMHG | RESPIRATION RATE: 17 BRPM | HEART RATE: 85 BPM | HEIGHT: 60 IN | WEIGHT: 148.59 LBS | TEMPERATURE: 98.9 F

## 2022-08-26 LAB
CHOLESTEROL, TOTAL: 174 MG/DL (ref 0–199)
ESTIMATED AVERAGE GLUCOSE: 108.3 MG/DL
HBA1C MFR BLD: 5.4 %
HCT VFR BLD CALC: 38.6 % (ref 36–48)
HDLC SERPL-MCNC: 75 MG/DL (ref 40–60)
HEMOGLOBIN: 12.9 G/DL (ref 12–16)
LDL CHOLESTEROL CALCULATED: 90 MG/DL
MCH RBC QN AUTO: 29.3 PG (ref 26–34)
MCHC RBC AUTO-ENTMCNC: 33.5 G/DL (ref 31–36)
MCV RBC AUTO: 87.5 FL (ref 80–100)
PDW BLD-RTO: 13 % (ref 12.4–15.4)
PLATELET # BLD: 385 K/UL (ref 135–450)
PMV BLD AUTO: 7.5 FL (ref 5–10.5)
RBC # BLD: 4.42 M/UL (ref 4–5.2)
TRIGL SERPL-MCNC: 44 MG/DL (ref 0–150)
URINE CULTURE, ROUTINE: NORMAL
VLDLC SERPL CALC-MCNC: 9 MG/DL
WBC # BLD: 7.7 K/UL (ref 4–11)

## 2022-08-26 PROCEDURE — 85027 COMPLETE CBC AUTOMATED: CPT

## 2022-08-26 PROCEDURE — G0378 HOSPITAL OBSERVATION PER HR: HCPCS

## 2022-08-26 PROCEDURE — 80061 LIPID PANEL: CPT

## 2022-08-26 PROCEDURE — 83036 HEMOGLOBIN GLYCOSYLATED A1C: CPT

## 2022-08-26 PROCEDURE — 36415 COLL VENOUS BLD VENIPUNCTURE: CPT

## 2022-08-26 PROCEDURE — 97161 PT EVAL LOW COMPLEX 20 MIN: CPT

## 2022-08-26 PROCEDURE — 6370000000 HC RX 637 (ALT 250 FOR IP): Performed by: FAMILY MEDICINE

## 2022-08-26 RX ORDER — ATORVASTATIN CALCIUM 80 MG/1
80 TABLET, FILM COATED ORAL NIGHTLY
Qty: 30 TABLET | Refills: 3 | Status: SHIPPED | OUTPATIENT
Start: 2022-08-26

## 2022-08-26 RX ORDER — ASPIRIN 81 MG/1
81 TABLET ORAL DAILY
Qty: 30 TABLET | Refills: 3 | Status: SHIPPED | OUTPATIENT
Start: 2022-08-27

## 2022-08-26 RX ADMIN — ASPIRIN 81 MG: 81 TABLET, COATED ORAL at 08:44

## 2022-08-26 NOTE — CARE COORDINATION
INITIAL CASE MANAGEMENT ASSESSMENT    Reviewed chart, met with patient to assess possible discharge needs. Explained Case Management role/services. Living Situation: Confirmed address, lives with kids (ages 15, 6, 9, 3 & 3), children with grandma while patient hospitalized. Lives in a 3 level house, 12-15 steps to enter. ADLs: Independent     DME: None    PT/OT Recs: Patient up independently in room     Active Services: None     Transportation: Active      Medications: No issues obtaining/affording medications    PCP: Maricruz Cary MD      HD/PD: n/a    PLAN/COMMENTS: Patient will discharge home today. Denies home needs. Patient to drive self home. SW/CM provided contact information for patient or family to call with any questions. SW/CM will follow and assist as needed.     Electronically signed by Cresenciano Dubin, RN Case Management [phone] on 8/26/2022 at 1:36 PM

## 2022-08-26 NOTE — PLAN OF CARE
Problem: Discharge Planning  Goal: Discharge to home or other facility with appropriate resources  8/26/2022 0125 by Terell Cortez RN  Outcome: Progressing  Flowsheets (Taken 8/25/2022 2324)  Discharge to home or other facility with appropriate resources: Identify barriers to discharge with patient and caregiver  8/25/2022 1925 by Carol Mccoy  Outcome: Progressing  Flowsheets (Taken 8/25/2022 1335)  Discharge to home or other facility with appropriate resources: Identify barriers to discharge with patient and caregiver     Problem: Pain  Goal: Verbalizes/displays adequate comfort level or baseline comfort level  8/26/2022 0125 by Terell Cortze RN  Outcome: Progressing  8/25/2022 1925 by Carol Mccoy  Outcome: Progressing     Problem: Neurosensory - Adult  Goal: Achieves stable or improved neurological status  Outcome: Progressing  Flowsheets (Taken 8/25/2022 2324)  Achieves stable or improved neurological status: Assess for and report changes in neurological status  Goal: Achieves maximal functionality and self care  Outcome: Progressing  Flowsheets (Taken 8/25/2022 2324)  Achieves maximal functionality and self care: Monitor swallowing and airway patency with patient fatigue and changes in neurological status

## 2022-08-26 NOTE — PLAN OF CARE
Problem: Discharge Planning  Goal: Discharge to home or other facility with appropriate resources  8/26/2022 0848 by Kristopher Godoy RN  Outcome: Progressing     Problem: Pain  Goal: Verbalizes/displays adequate comfort level or baseline comfort level  8/26/2022 0848 by Kristopher Godoy RN  Outcome: Progressing     Problem: Neurosensory - Adult  Goal: Achieves stable or improved neurological status  8/26/2022 0848 by Kristopher Godoy RN  Outcome: Progressing     Problem: Neurosensory - Adult  Goal: Achieves maximal functionality and self care  8/26/2022 0848 by Kristopher Godoy RN  Outcome: Progressing     Problem: Safety - Adult  Goal: Free from fall injury  Outcome: Progressing

## 2022-08-26 NOTE — DISCHARGE SUMMARY
Hospital Medicine Discharge Summary    Patient: Shady Hodge     Gender: female  : 1985   Age: 31 y.o. MRN: [id number]    Admitting Physician: Lena Charles DO  Discharge Physician: Celeste Crook MD     Code Status: Full Code     Admit Date: 2022   Discharge Date:   22    Disposition:  Home    Discharge Diagnoses: Active Hospital Problems    Diagnosis Date Noted    Paresthesias [R20.2] 2022     Priority: Medium       Follow-up appointments:  as arranged with  neurology    Outpatient to do list: none    Condition at Discharge:  Stable    Hospital Course: The patient is a 31 y.o. female who presents to [facility] with right leg numbness, and right thumb and index finger numbness intermittently for one week. She denies weakness pain. No changes in speech. Denies visual changes. MRI brain/spine- showed features of demyelination. Neurology saw her and didn't start steroids as patient had symptoms for 3-4 weeks already. She was highly encouraged to follow up with  neuroimmunology.       Discharge Medications:   Current Discharge Medication List        START taking these medications    Details   atorvastatin (LIPITOR) 80 MG tablet Take 1 tablet by mouth nightly  Qty: 30 tablet, Refills: 3      aspirin 81 MG EC tablet Take 1 tablet by mouth daily  Qty: 30 tablet, Refills: 3           Current Discharge Medication List        Current Discharge Medication List        CONTINUE these medications which have NOT CHANGED    Details   aspirin-acetaminophen-caffeine (EXCEDRIN MIGRAINE) 250-250-65 MG per tablet Take 1 tablet by mouth every 6 hours as needed for Headaches      acetaminophen (TYLENOL) 500 MG tablet Take 500 mg by mouth every 6 hours as needed for Pain      naproxen sodium (ALEVE) 220 MG tablet Take 220 mg by mouth 2 times daily as needed for Pain (Jaw pain)           Current Discharge Medication List          Discharge ROS:  A complete review of systems was asked and negative except for above     Discharge Exam:    BP (!) 142/85   Pulse 85   Temp 98.9 °F (37.2 °C) (Oral)   Resp 17   Ht 5' (1.524 m)   Wt 148 lb 9.4 oz (67.4 kg)   SpO2 99%   BMI 29.02 kg/m²     General appearance: No apparent distress, appears stated age and cooperative. HEENT: Pupils equal, round, and reactive to light. Conjunctivae/corneas clear. Neck: Supple, with full range of motion. No jugular venous distention. Trachea midline. Respiratory:  Normal respiratory effort. Clear to auscultation, bilaterally without Rales/Wheezes/Rhonchi. Cardiovascular: Regular rate and rhythm with normal S1/S2 without murmurs, rubs or gallops. Abdomen: Soft, non-tender, non-distended with normal bowel sounds. Musculoskeletal: No clubbing, cyanosis or edema bilaterally. Full range of motion without deformity. Skin: Skin color, texture, turgor normal.  No rashes or lesions. Neurologic:  Neurovascularly intact without any focal sensory/motor deficits. Cranial nerves: II-XII intact, grossly non-focal.  Psychiatric: Alert and oriented, thought content appropriate, normal insight  Capillary Refill: Brisk,3 seconds, normal   Peripheral Pulses: +2 palpable, equal bilaterally     Labs:  For convenience and continuity at follow-up the following most recent labs are provided:    Lab Results   Component Value Date/Time    WBC 7.7 08/26/2022 05:37 AM    HGB 12.9 08/26/2022 05:37 AM    HCT 38.6 08/26/2022 05:37 AM    MCV 87.5 08/26/2022 05:37 AM     08/26/2022 05:37 AM     08/25/2022 03:54 AM    K 3.5 08/25/2022 03:54 AM     08/25/2022 03:54 AM    CO2 24 08/25/2022 03:54 AM    BUN 9 08/25/2022 03:54 AM    CREATININE 0.6 08/25/2022 03:54 AM    CALCIUM 9.0 08/25/2022 03:54 AM    PHOS 4.7 12/20/2015 10:19 AM    ALKPHOS 77 08/25/2022 03:54 AM    ALT 15 08/25/2022 03:54 AM    AST 16 08/25/2022 03:54 AM    BILITOT 0.3 08/25/2022 03:54 AM    LABALBU 4.2 08/25/2022 03:54 AM    LDLCALC 90 08/26/2022 05:37 AM    TRIG 44 08/26/2022 05:37 AM     Lab Results   Component Value Date    INR 0.98 08/25/2022    INR 0.93 08/29/2016       Radiology:  CT HEAD WO CONTRAST    Result Date: 8/25/2022  EXAMINATION: CT OF THE HEAD WITHOUT CONTRAST  8/25/2022 4:37 am TECHNIQUE: CT of the head was performed without the administration of intravenous contrast. Automated exposure control, iterative reconstruction, and/or weight based adjustment of the mA/kV was utilized to reduce the radiation dose to as low as reasonably achievable. COMPARISON: 08/27/2016, MRI on 08/28/2016 HISTORY: ORDERING SYSTEM PROVIDED HISTORY: Numbness of entire right leg and her right index finger and thumb for 1 week. No headache. No previous history. TECHNOLOGIST PROVIDED HISTORY: Has a \"code stroke\" or \"stroke alert\" been called? ->No Reason for exam:->Numbness of entire right leg and her right index finger and thumb for 1 week. No headache. No previous history. Decision Support Exception - unselect if not a suspected or confirmed emergency medical condition->Emergency Medical Condition (MA) Is the patient pregnant?->No Reason for Exam: Numbness of right leg and right thumb and index finger for 1 week- FINDINGS: BRAIN/VENTRICLES: There is no acute intracranial hemorrhage, mass effect or midline shift. No abnormal extra-axial fluid collection. The gray-white differentiation is maintained without evidence of an acute infarct. There is no evidence of hydrocephalus. No cortical atrophy. No basilar cistern or sulcal effacement. No intracranial mass is identified. Minimal white matter low attenuation is identified, most notably within the posterior right parietal lobe on axial image 36 in an area of known white matter disease on previous MR imaging. ORBITS: The visualized portion of the orbits demonstrate no acute abnormality. SINUSES: The visualized paranasal sinuses and mastoid air cells demonstrate no acute abnormality.  SOFT TISSUES/SKULL:  No acute abnormality of the visualized skull or soft tissues. 1. No acute hemorrhage. 2. Minimal white matter low attenuation in an area of the right posterior parietal lobe, better seen on previous MR imaging in 2016. The extent of white matter findings are better appreciated on MR imaging than can be seen on CT. MRI CERVICAL SPINE W WO CONTRAST    Result Date: 8/25/2022  EXAMINATION: MRI OF THE CERVICAL SPINE WITHOUT AND WITH CONTRAST  8/25/2022 3:12 pm: TECHNIQUE: Multiplanar multisequence MRI of the cervical spine was performed without and with the administration of intravenous contrast. COMPARISON: None. HISTORY: ORDERING SYSTEM PROVIDED HISTORY: rle amd rt hand paresthesias TECHNOLOGIST PROVIDED HISTORY: Reason for exam:->rle amd rt hand paresthesias Decision Support Exception - unselect if not a suspected or confirmed emergency medical condition->Emergency Medical Condition (MA) Initial evaluation. FINDINGS: Motion degrades images limiting evaluation. BONES/ALIGNMENT: The vertebral body heights appear maintained. There is slight reversal of the normal cervical lordosis. No significant spondylolisthesis. The bone marrow signal demonstrates no acute abnormality. SPINAL CORD: There appear to be numerous scattered foci of T2 hyperintensity within the cervical cord. No convincing associated contrast enhancement is seen. SOFT TISSUES: No paraspinal mass identified. A right thyroid nodule is seen measuring 2.3 cm. C2-C3: There is no significant disc bulge, spinal canal stenosis or neural foraminal narrowing. C3-C4: There is no significant disc bulge, spinal canal stenosis or neural foraminal narrowing. C4-C5: There is no significant disc bulge, spinal canal stenosis or neural foraminal narrowing. C5-C6: There is no significant disc bulge, spinal canal stenosis or neural foraminal narrowing. C6-C7: There is no significant disc bulge, spinal canal stenosis or neural foraminal narrowing.  C7-T1: There is no index finger for 1 week TECHNOLOGIST PROVIDED HISTORY: Reason for exam:->Numbness of right leg and right thumb and index finger for 1 week Has a \"code stroke\" or \"stroke alert\" been called? ->No Decision Support Exception - unselect if not a suspected or confirmed emergency medical condition->Emergency Medical Condition (MA) Reason for Exam: Numbness of right leg and right thumb and index finger for 1 week- FINDINGS: CTA NECK: AORTIC ARCH/ARCH VESSELS: No dissection or arterial injury. No significant stenosis of the brachiocephalic or subclavian arteries. CAROTID ARTERIES: No dissection, arterial injury, or hemodynamically significant stenosis by NASCET criteria. VERTEBRAL ARTERIES: No dissection, arterial injury, or significant stenosis. SOFT TISSUES: The lung apices are clear. No cervical or superior mediastinal lymphadenopathy. The larynx and pharynx are unremarkable. No acute abnormality of the salivary. There is a prominent right thyroid nodule, which has irregular outlines with internal enhancing septations, measuring 2.2 x 1.8 cm. Follow-up thyroid ultrasound study is recommended. BONES: No acute osseous abnormality. CTA HEAD: ANTERIOR CIRCULATION: No significant stenosis of the intracranial internal carotid, anterior cerebral, or middle cerebral arteries. No aneurysm. POSTERIOR CIRCULATION: No significant stenosis of the vertebral, basilar, or posterior cerebral arteries. No aneurysm. OTHER: No dural venous sinus thrombosis on this non-dedicated study. BRAIN: No mass effect or midline shift. No extra-axial fluid collection. The gray-white differentiation is maintained. Unremarkable CTA of the head and neck. Prominent complex right thyroid nodule measuring 2.2 cm. Follow-up nonemergent thyroid ultrasound study recommended. RECOMMENDATIONS: Followup:  Nonemergent thyroid ultrasound study.      MRI BRAIN WO CONTRAST    Result Date: 8/25/2022  EXAMINATION: MRI OF THE BRAIN WITHOUT CONTRAST  8/25/2022 11:14 am TECHNIQUE: Multiplanar multisequence MRI of the brain was performed without the administration of intravenous contrast. COMPARISON: 08/28/2016. HISTORY: ORDERING SYSTEM PROVIDED HISTORY: focal numbness, rle TECHNOLOGIST PROVIDED HISTORY: Reason for exam:->focal numbness, rle Decision Support Exception - unselect if not a suspected or confirmed emergency medical condition->Emergency Medical Condition (MA) Reason for Exam: focal numbness, rle Initial evaluation. FINDINGS: INTRACRANIAL STRUCTURES/VENTRICLES: There are multiple T2 FLAIR hyperintense lesions involving the periventricular and subcortical white matter with suggestion of additional lesions involving the infratentorial compartment. Some of these demonstrate an anti radial orientation appear progressed from the exam from 2016. Many of these demonstrate T2 shine through on the diffusion-weighted sequence. There may be a punctate lesion within the right periventricular white matter, which demonstrates true restricted diffusion (DWI series 5, image 18). There is no significant mass effect or midline shift. No acute intracranial hemorrhage. The size and configuration of the ventricles and sulci appear normal.  The normal signal voids within the major intracranial vessels appear maintained. There is suggestion of a cyst within the dorsal sella, which is unchanged the prior exam.  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 ORBITS: The visualized portion of the orbits demonstrate no acute abnormality. SINUSES: The visualized paranasal sinuses and mastoid air cells demonstrate no acute abnormality. BONES/SOFT TISSUES: The bone marrow signal intensity appears normal. The soft tissues demonstrate no acute abnormality. 1. Multiple T2 FLAIR hyperintense lesions are seen involving the supratentorial compartment with questionable lesions also seen within the infratentorial compartment.   Many of these demonstrate an anti radial orientation suggesting a demyelinating process. These have progressed from the prior exam from 2016. 2. There may be restricted diffusion involving a punctate lesion within the right periventricular region, which is compatible with a focus of active demyelination. Less likely this represents an acute punctate infarct. 3. Otherwise, no acute intracranial abnormality. 4. 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       EKG     Rhythm: normal sinus   Rate: normal  Clinical Impression: no acute changes        The patient was seen and examined on day of discharge and this discharge summary is in conjunction with any daily progress note from day of discharge. Time Spent on discharge is 30 minutes  in the examination, evaluation, counseling and review of medications and discharge plan. Note that mor than 30 minutes was spent in preparing discharge papers, discussing discharge with patient, medication review, etc.       Signed:    Yogesh Sibley MD   8/26/2022      Thank you Kg Porter MD for the opportunity to be involved in this patient's care.  If you have any questions or concerns please feel free to contact me at [facility]

## 2022-08-26 NOTE — PROGRESS NOTES
Physical Therapy  Facility/Department: [facility] PROGRESSIVE CARE  Physical Therapy Initial Assessment/Discharge Summary    Name: Pooja Graham  : 1985  MRN: [id number]  Date of Service: 2022    Discharge Recommendations:  Home with assist PRN   PT Equipment Recommendations  Equipment Needed: No      Patient Diagnosis(es): The encounter diagnosis was Right sided numbness. Past Medical History:  has a past medical history of Migraine. Past Surgical History:  has a past surgical history that includes  section; Abdomen surgery; fracture surgery; and hip surgery (Left). Assessment   Body Structures, Functions, Activity Limitations Requiring Skilled Therapeutic Intervention: Decreased sensation  Assessment: Pt is a 31 y.o. F. under observation for R hand and RLE numbness, treated for probable MS. She presents pleasant and agreeable to evaluation, c/o persistent numbness, but denying weakness or instability. PT noted UE/LE strength WNL, and pt was able to ambulate independently in room without device. She denies any hx of falls. She appears to be functioning at her baseline for mobility, and does not require additional therapy in the acute setting. PT educated pt that if she notices any decline in balance/strength, it may be beneficial to pursue physical therapy in the future. She verbalized understanding. No DME or home PT needed at this time. Pooja Graham scored a 24/24 on the AM-PAC short mobility form. If patient discharges prior to next session this note will serve as a discharge summary. Please see below for the latest assessment towards goals. Specific Instructions for Next Treatment: N/A  Therapy Prognosis: Guarded  Decision Making: Low Complexity  History: See below  Exam: Strength; ROM; Balance;  Ambulation  Clinical Presentation: Stable  Barriers to Learning: None  Activity Tolerance  Activity Tolerance: Patient tolerated evaluation without incident Plan   Plan  Specific Instructions for Next Treatment: N/A  Safety Devices  Type of Devices: All fall risk precautions in place, Call light within reach, Left in bed  Restraints  Restraints Initially in Place: No     Restrictions  Restrictions/Precautions  Restrictions/Precautions: Fall Risk  Position Activity Restriction  Other position/activity restrictions: R-sided numbness     Subjective   General  Chart Reviewed: Yes  Additional Pertinent Hx: Jose Juan Santoyo is a 31 y.o. female who presents with RLE numbness. Patient has had 3-4 RLE numbness and tip right thumb and index finger. She has been evaluated by neurology in 2016 and there was suspected MS. Response To Previous Treatment: Not applicable  Referring Practitioner: Dr. Shabbir Gardner  Referral Date : 08/25/22  Diagnosis: Possible MS; RLE parasthesia  Follows Commands: Within Functional Limits  Subjective  Subjective: Pt reports persistent R hand and RLE numbness. No pain. Social/Functional History  Social/Functional History  Lives With:  (Children)  Type of Home: House  Home Layout: Multi-level, Able to Live on Main level with bedroom/bathroom  Home Access: Stairs to enter with rails  Entrance Stairs - Number of Steps: 12  Bathroom Shower/Tub: Tub/Shower unit  Bathroom Toilet: Standard  Home Equipment:  (No DME)  ADL Assistance: Independent  Ambulation Assistance: Independent  Transfer Assistance: Independent  Active : Yes  Occupation: Full time employment  Type of Occupation:   Additional Comments: No recent falls    [address]: Within Functional Limits  Hearing  Hearing: Within functional limits      Cognition   Orientation  Overall Orientation Status: Within Normal Limits     Objective     Observation/Palpation  Observation: Pt c/o numbness in R hand and throughout RLE. She is able to localize sensation to light touch R toes 1-5.       AROM RLE (degrees)  RLE AROM: WNL  AROM LLE (degrees)  LLE AROM : WNL  AROM RUE (degrees)  RUE AROM : WNL  AROM LUE (degrees)  LUE AROM : WNL    Strength RLE  Strength RLE: WNL  Strength LLE  Strength LLE: WNL  Strength RUE  Strength RUE: WNL  Strength LUE  Strength LUE: WNL       Bed mobility  Supine to Sit: Independent  Sit to Supine: Independent    Transfers  Sit to Stand: Independent  Stand to sit: Independent    Ambulation  Surface: level tile  Device: No Device  Assistance: Independent  Quality of Gait: Fast pace, adequate step length, no LOB. No fatigue reported. Distance: 10' x 2    AM-PAC Score  AM-PAC Inpatient Mobility Raw Score : 24 (08/26/22 0824)  AM-PAC Inpatient T-Scale Score : 61.14 (08/26/22 0824)  Mobility Inpatient CMS 0-100% Score: 0 (08/26/22 0824)  Mobility Inpatient CMS G-Code Modifier : [facility] (08/26/22 8785)    Goals  Short Term Goals  Time Frame for Short term goals: No acute PT goals  Patient Goals   Patient goals :  To return home    Therapy Time   Individual Concurrent Group Co-treatment   Time In 0805         Time Out 0820         Minutes 15             Low Complexity Evaluation    Rodo Srinivasan, PT   Electronically signed by Rodo Srinivasan, PT [id number] on 8/26/2022 at 8:28 AM

## 2022-08-26 NOTE — PROGRESS NOTES
Hospitalist Progress Note      PCP: Aguila Singh MD    Date of Admission: 8/25/2022    Chief Complaint:   LUPE ROCHA Course: The patient is a 31 y.o. female who presents to [facility] with right leg numbness, and right thumb and index finger numbness intermittently for one week. She denies weakness pain. No changes in speech. Denies visual changes. Subjective:          Medications:  Reviewed    Infusion Medications   Scheduled Medications    enoxaparin  40 mg SubCUTAneous QPM    aspirin  81 mg Oral Daily    Or    aspirin  300 mg Rectal Daily    atorvastatin  80 mg Oral Nightly     PRN Meds: ondansetron **OR** ondansetron, polyethylene glycol    No intake or output data in the 24 hours ending 08/26/22 1101    Physical Exam Performed:    BP (!) 145/95   Pulse 88   Temp 98 °F (36.7 °C) (Oral)   Resp 16   Ht 5' (1.524 m)   Wt 148 lb 9.4 oz (67.4 kg)   SpO2 99%   BMI 29.02 kg/m²     General appearance: No apparent distress, appears stated age and cooperative. HEENT: Pupils equal, round, and reactive to light. Conjunctivae/corneas clear. Neck: Supple, with full range of motion. No jugular venous distention. Trachea midline. Respiratory:  Normal respiratory effort. Clear to auscultation, bilaterally without Rales/Wheezes/Rhonchi. Cardiovascular: Regular rate and rhythm with normal S1/S2 without murmurs, rubs or gallops. Abdomen: Soft, non-tender, non-distended with normal bowel sounds. Musculoskeletal: No clubbing, cyanosis or edema bilaterally. Full range of motion without deformity. Skin: Skin color, texture, turgor normal.  No rashes or lesions. Neurologic:  Neurovascularly intact without any focal sensory/motor deficits.  Cranial nerves: II-XII intact, grossly non-focal.  Psychiatric: Alert and oriented, thought content appropriate, normal insight  Capillary Refill: Brisk,3 seconds, normal   Peripheral Pulses: +2 palpable, equal bilaterally       Labs:   Recent Labs 08/25/22  0354 08/26/22  0537   WBC 8.4 7.7   HGB 12.6 12.9   HCT 37.4 38.6    385     Recent Labs     08/25/22 0354      K 3.5      CO2 24   BUN 9   CREATININE 0.6   CALCIUM 9.0     Recent Labs     08/25/22 0354   AST 16   ALT 15   BILITOT 0.3   ALKPHOS 77     Recent Labs     08/25/22 0354   INR 0.98     Recent Labs     08/25/22 0354   TROPONINI <0.01       Urinalysis:      Lab Results   Component Value Date/Time    NITRU Negative 08/25/2022 03:54 AM    WBCUA 23 08/25/2022 03:54 AM    BACTERIA 1+ 08/25/2022 03:54 AM    RBCUA 2 08/25/2022 03:54 AM    BLOODU Negative 08/25/2022 03:54 AM    SPECGRAV 1.026 08/25/2022 03:54 AM    GLUCOSEU Negative 08/25/2022 03:54 AM       Radiology:  MRI CERVICAL SPINE W WO CONTRAST   Final Result   1. There appear to be multiple scattered T2 hyperintense foci within the   cervical cord without convincing associated contrast enhancement. These   would support a clinical diagnosis of a demyelinating process. 2. No significant spinal canal stenosis or neural foraminal narrowing of the   cervical spine. 3. A right thyroid nodule is seen measuring up to 2.3 cm. Recommend further   evaluation with a nonemergent/outpatient thyroid ultrasound. RECOMMENDATIONS:   2.3 cm incidental right thyroid nodule. Recommend thyroid US. Reference: J Am [facility] Radiol. 2015 Feb;12(2): 143-50         MRI BRAIN WO CONTRAST   Final Result   1. Multiple T2 FLAIR hyperintense lesions are seen involving the   supratentorial compartment with questionable lesions also seen within the   infratentorial compartment. Many of these demonstrate an anti radial   orientation suggesting a demyelinating process. These have progressed from   the prior exam from 2016.   2. There may be restricted diffusion involving a punctate lesion within the   right periventricular region, which is compatible with a focus of active   demyelination. Less likely this represents an acute punctate infarct. 3. Otherwise, no acute intracranial abnormality. 4. 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         CTA HEAD NECK W CONTRAST   Final Result   Unremarkable CTA of the head and neck. Prominent complex right thyroid nodule measuring 2.2 cm. Follow-up   nonemergent thyroid ultrasound study recommended. RECOMMENDATIONS:   Followup:  Nonemergent thyroid ultrasound study. CT HEAD WO CONTRAST   Final Result   1. No acute hemorrhage. 2. Minimal white matter low attenuation in an area of the right posterior   parietal lobe, better seen on previous MR imaging in 2016. The extent of   white matter findings are better appreciated on MR imaging than can be seen   on CT. XR CHEST PORTABLE   Final Result   Clear chest without acute cardiopulmonary process. ASSESSMENT/PLAN:  Paresthesias, right leg and focal parestheias of right hand  - head CT neg for acute pathology  - CTA head/neck nonacute  - MRI brain/spine- shows possible demyelination. -RPR/CRP/ESR/HIV/ADEM serology  - ASA, statin  -await neurology input     2.  Incidental thryroid nodule  - on cta  -TSH suppressed  - Prominent complex right thyroid nodule measuring 2.2 cm.  - out pt work up with us thryroid     DVT Prophylaxis: lovenox  Diet: No diet orders on file  Code Status: Prior     Abigail Hernández MD

## 2022-08-26 NOTE — PROGRESS NOTES
Discharge orders acknowledged by RN . Discharge teaching completed with pt and family. AVS reviewed and all questions answered. Medication regimen reviewed and pt understands schedule. Follow up appointments also reviewed with pt and resources given for discharge. Pt was sent electronic to be filled and understands schedule. IV removed. Bedside monitor removed from pt. 60+ minutes of education completed. Required core measures completed. Pt vitals WDL. Pt discharged with all belongings to home with significant other. Pt transported off of unit via wheelchair. No complications.        Electronically signed by Abel Raymundo RN on 8/26/2022 at 2:24 PM

## 2022-08-26 NOTE — PROGRESS NOTES
Occupational Therapy  Orders received. Chart reviewed. Pt up independently in room. Pt with no ongoing OT needs. Will sign off at this time.      Brandy Nuñez, OTR/L

## 2022-10-07 ENCOUNTER — OFFICE VISIT (OUTPATIENT)
Dept: PRIMARY CARE CLINIC | Age: 37
End: 2022-10-07
Payer: COMMERCIAL

## 2022-10-07 VITALS
WEIGHT: 151 LBS | HEIGHT: 63 IN | RESPIRATION RATE: 18 BRPM | BODY MASS INDEX: 26.75 KG/M2 | OXYGEN SATURATION: 98 % | HEART RATE: 74 BPM | SYSTOLIC BLOOD PRESSURE: 133 MMHG | TEMPERATURE: 97 F | DIASTOLIC BLOOD PRESSURE: 88 MMHG

## 2022-10-07 DIAGNOSIS — G43.009 MIGRAINE WITHOUT AURA AND WITHOUT STATUS MIGRAINOSUS, NOT INTRACTABLE: ICD-10-CM

## 2022-10-07 DIAGNOSIS — R20.0 NUMBNESS AND TINGLING OF RIGHT ARM AND LEG: ICD-10-CM

## 2022-10-07 DIAGNOSIS — R20.2 NUMBNESS AND TINGLING OF RIGHT ARM AND LEG: ICD-10-CM

## 2022-10-07 DIAGNOSIS — Z76.89 ENCOUNTER TO ESTABLISH CARE: ICD-10-CM

## 2022-10-07 PROCEDURE — 99204 OFFICE O/P NEW MOD 45 MIN: CPT | Performed by: FAMILY MEDICINE

## 2022-10-07 PROCEDURE — 1036F TOBACCO NON-USER: CPT | Performed by: FAMILY MEDICINE

## 2022-10-07 PROCEDURE — G8427 DOCREV CUR MEDS BY ELIG CLIN: HCPCS | Performed by: FAMILY MEDICINE

## 2022-10-07 PROCEDURE — G8484 FLU IMMUNIZE NO ADMIN: HCPCS | Performed by: FAMILY MEDICINE

## 2022-10-07 PROCEDURE — G8419 CALC BMI OUT NRM PARAM NOF/U: HCPCS | Performed by: FAMILY MEDICINE

## 2022-10-07 RX ORDER — IBUPROFEN 800 MG/1
TABLET ORAL
COMMUNITY
Start: 2022-10-03

## 2022-10-07 RX ORDER — AMOXICILLIN 500 MG/1
CAPSULE ORAL
COMMUNITY
Start: 2022-10-03 | End: 2022-10-10

## 2022-10-07 ASSESSMENT — PATIENT HEALTH QUESTIONNAIRE - PHQ9
2. FEELING DOWN, DEPRESSED OR HOPELESS: 0
SUM OF ALL RESPONSES TO PHQ QUESTIONS 1-9: 0
1. LITTLE INTEREST OR PLEASURE IN DOING THINGS: 0
SUM OF ALL RESPONSES TO PHQ9 QUESTIONS 1 & 2: 0
SUM OF ALL RESPONSES TO PHQ QUESTIONS 1-9: 0

## 2022-10-07 ASSESSMENT — ENCOUNTER SYMPTOMS
BLOOD IN STOOL: 0
CONSTIPATION: 0
DIARRHEA: 0
SHORTNESS OF BREATH: 0
ABDOMINAL PAIN: 0
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
## 2023-05-15 ENCOUNTER — APPOINTMENT (OUTPATIENT)
Dept: GENERAL RADIOLOGY | Age: 38
End: 2023-05-15
Payer: COMMERCIAL

## 2023-05-15 ENCOUNTER — HOSPITAL ENCOUNTER (EMERGENCY)
Age: 38
Discharge: HOME | End: 2023-05-15
Payer: COMMERCIAL

## 2023-05-15 VITALS
RESPIRATION RATE: 17 BRPM | HEIGHT: 60 IN | WEIGHT: 155.65 LBS | DIASTOLIC BLOOD PRESSURE: 83 MMHG | HEART RATE: 79 BPM | OXYGEN SATURATION: 99 % | BODY MASS INDEX: 30.56 KG/M2 | SYSTOLIC BLOOD PRESSURE: 141 MMHG | TEMPERATURE: 98.2 F

## 2023-05-15 DIAGNOSIS — M79.671 ACUTE FOOT PAIN, RIGHT: Primary | ICD-10-CM

## 2023-05-15 PROCEDURE — 73630 X-RAY EXAM OF FOOT: CPT

## 2023-05-15 PROCEDURE — 99283 EMERGENCY DEPT VISIT LOW MDM: CPT

## 2023-05-15 RX ORDER — NAPROXEN 500 MG/1
500 TABLET ORAL 2 TIMES DAILY WITH MEALS
Qty: 30 TABLET | Refills: 0 | Status: SHIPPED | OUTPATIENT
Start: 2023-05-15

## 2023-05-15 RX ORDER — SODIUM CHLORIDE 9 MG/ML
INJECTION, SOLUTION INTRAVENOUS PRN
Status: DISCONTINUED | OUTPATIENT
Start: 2023-05-15 | End: 2023-05-15

## 2023-05-15 ASSESSMENT — PAIN SCALES - GENERAL
PAINLEVEL_OUTOF10: 0
PAINLEVEL_OUTOF10: 0

## 2023-05-15 ASSESSMENT — PAIN - FUNCTIONAL ASSESSMENT: PAIN_FUNCTIONAL_ASSESSMENT: 0-10

## 2023-05-15 NOTE — DISCHARGE INSTRUCTIONS
Ice foot for 10 to 15 minutes 3 times a day, elevate. Take naproxen for pain inflammation. Up with orthopedics outpatient. You can use postop shoe for comfort as needed.
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
## 2023-05-15 NOTE — ED NOTES
Pt discharged to home.   Follow up and discharge orders given     Diomedes Prater, PROSPER  05/15/23 5625

## 2023-05-19 ENCOUNTER — OFFICE VISIT (OUTPATIENT)
Dept: PRIMARY CARE CLINIC | Age: 38
End: 2023-05-19

## 2023-05-19 VITALS
OXYGEN SATURATION: 98 % | WEIGHT: 155 LBS | RESPIRATION RATE: 18 BRPM | DIASTOLIC BLOOD PRESSURE: 88 MMHG | BODY MASS INDEX: 30.27 KG/M2 | SYSTOLIC BLOOD PRESSURE: 132 MMHG | HEART RATE: 74 BPM

## 2023-05-19 DIAGNOSIS — Z02.0 SCHOOL PHYSICAL EXAM: Primary | ICD-10-CM

## 2023-05-19 DIAGNOSIS — G43.009 MIGRAINE WITHOUT AURA AND WITHOUT STATUS MIGRAINOSUS, NOT INTRACTABLE: ICD-10-CM

## 2023-05-19 DIAGNOSIS — G35 RELAPSING REMITTING MULTIPLE SCLEROSIS (HCC): ICD-10-CM

## 2023-05-19 RX ORDER — SIPONIMOD 2 MG/1
1 TABLET, FILM COATED ORAL DAILY
Qty: 30 TABLET | Refills: 5 | COMMUNITY
Start: 2023-05-19

## 2023-05-19 ASSESSMENT — ENCOUNTER SYMPTOMS
CONSTIPATION: 0
DIARRHEA: 0
SHORTNESS OF BREATH: 0
ABDOMINAL PAIN: 0
BLOOD IN STOOL: 0

## 2023-05-19 ASSESSMENT — PATIENT HEALTH QUESTIONNAIRE - PHQ9
1. LITTLE INTEREST OR PLEASURE IN DOING THINGS: 0
SUM OF ALL RESPONSES TO PHQ QUESTIONS 1-9: 0
2. FEELING DOWN, DEPRESSED OR HOPELESS: 0
SUM OF ALL RESPONSES TO PHQ9 QUESTIONS 1 & 2: 0
SUM OF ALL RESPONSES TO PHQ QUESTIONS 1-9: 0

## 2023-05-19 NOTE — PROGRESS NOTES
2023     Glory Lopez (:  1985) is a 30 y.o. female, here for evaluation of the following medical concerns:    HPI  Patient presented to the office for school physical and TB test.  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 She has no signs of communicable disease. She has migraine and takes over-the-counter Excedrin. She was recently diagnosed with remitting relapsing multiple sclerosis goes to [facility] neurology and doing well with current medication. She denies headache nausea vomiting. Denies chest pain or shortness of breath. Denies bowel or urinary disturbance ,denies focal neurologic deficit. Review of Systems   Constitutional:  Negative for activity change and appetite change. Eyes:  Negative for visual disturbance. Respiratory:  Negative for shortness of breath. Cardiovascular:  Negative for chest pain and leg swelling. Gastrointestinal:  Negative for abdominal pain, blood in stool, constipation and diarrhea. Genitourinary:  Negative for difficulty urinating, frequency, hematuria, menstrual problem and urgency. Neurological:  Negative for dizziness and syncope. Psychiatric/Behavioral:  Negative for behavioral problems. Prior to Visit Medications    Medication Sig Taking? Authorizing Provider   Siponimod Fumarate (MAYZENT) 2 MG TABS Take 1 tablet by mouth daily Yes [place] Weeks MD   naproxen (NAPROSYN) 500 MG tablet Take 1 tablet by mouth 2 times daily (with meals) Yes Rufina De Luna PA-C   aspirin-acetaminophen-caffeine (EXCEDRIN MIGRAINE) 316-148-43 MG per tablet Take 1 tablet by mouth every 6 hours as needed for Headaches Yes Historical Provider, MD        Social History     Tobacco Use    Smoking status: Never    Smokeless tobacco: Never    Tobacco comments:     congrats   Substance Use Topics    Alcohol use:  Yes     Alcohol/week: 2.0 standard drinks     Types: 2 Standard drinks or equivalent per week        Vitals:    23 1545

## 2023-05-20 DIAGNOSIS — Z02.0 SCHOOL PHYSICAL EXAM: ICD-10-CM

## 2023-05-22 LAB
GAMMA INTERFERON BACKGROUND BLD IA-ACNC: 0.03 IU/ML
MITOGEN IGNF BCKGRD COR BLD-ACNC: >10 IU/ML
QUANTI TB GOLD PLUS: NEGATIVE
QUANTI TB1 MINUS NIL: 0 IU/ML (ref 0–0.34)
QUANTI TB2 MINUS NIL: 0 IU/ML (ref 0–0.34)

## 2023-07-27 ENCOUNTER — APPOINTMENT (OUTPATIENT)
Dept: GENERAL RADIOLOGY | Age: 38
End: 2023-07-27
Payer: COMMERCIAL

## 2023-07-27 ENCOUNTER — HOSPITAL ENCOUNTER (EMERGENCY)
Age: 38
Discharge: HOME | End: 2023-07-27
Payer: COMMERCIAL

## 2023-07-27 VITALS
HEART RATE: 69 BPM | HEIGHT: 60 IN | OXYGEN SATURATION: 100 % | RESPIRATION RATE: 16 BRPM | BODY MASS INDEX: 31.42 KG/M2 | SYSTOLIC BLOOD PRESSURE: 140 MMHG | TEMPERATURE: 97.2 F | DIASTOLIC BLOOD PRESSURE: 91 MMHG | WEIGHT: 160.05 LBS

## 2023-07-27 DIAGNOSIS — M25.552 LEFT HIP PAIN: Primary | ICD-10-CM

## 2023-07-27 LAB — HCG UR QL: NEGATIVE

## 2023-07-27 PROCEDURE — 73502 X-RAY EXAM HIP UNI 2-3 VIEWS: CPT

## 2023-07-27 PROCEDURE — 99284 EMERGENCY DEPT VISIT MOD MDM: CPT

## 2023-07-27 PROCEDURE — 84703 CHORIONIC GONADOTROPIN ASSAY: CPT

## 2023-07-27 RX ORDER — LIDOCAINE 50 MG/G
1 PATCH TOPICAL DAILY
Qty: 20 PATCH | Refills: 0 | Status: SHIPPED | OUTPATIENT
Start: 2023-07-27 | End: 2023-08-16

## 2023-07-27 RX ORDER — IBUPROFEN 600 MG/1
600 TABLET ORAL EVERY 8 HOURS PRN
Qty: 30 TABLET | Refills: 0 | Status: SHIPPED | OUTPATIENT
Start: 2023-07-27

## 2023-07-27 RX ORDER — TRAMADOL HYDROCHLORIDE 50 MG/1
50 TABLET ORAL EVERY 6 HOURS PRN
Qty: 10 TABLET | Refills: 0 | Status: SHIPPED | OUTPATIENT
Start: 2023-07-27 | End: 2023-07-30

## 2023-07-27 ASSESSMENT — PAIN DESCRIPTION - LOCATION: LOCATION: HIP

## 2023-07-27 ASSESSMENT — PAIN - FUNCTIONAL ASSESSMENT: PAIN_FUNCTIONAL_ASSESSMENT: 0-10

## 2023-07-27 ASSESSMENT — PAIN DESCRIPTION - ORIENTATION: ORIENTATION: LEFT

## 2023-07-27 ASSESSMENT — PAIN DESCRIPTION - DESCRIPTORS: DESCRIPTORS: SPASM

## 2023-07-27 ASSESSMENT — ENCOUNTER SYMPTOMS
EYE PAIN: 0
COUGH: 0
BACK PAIN: 0
SORE THROAT: 0
SHORTNESS OF BREATH: 0
NAUSEA: 0
VOMITING: 0
ABDOMINAL PAIN: 0

## 2023-07-27 ASSESSMENT — LIFESTYLE VARIABLES
HOW MANY STANDARD DRINKS CONTAINING ALCOHOL DO YOU HAVE ON A TYPICAL DAY: 1 OR 2
HOW OFTEN DO YOU HAVE A DRINK CONTAINING ALCOHOL: MONTHLY OR LESS

## 2023-07-27 ASSESSMENT — PAIN SCALES - GENERAL
PAINLEVEL_OUTOF10: 5
PAINLEVEL_OUTOF10: 10
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
## 2023-07-27 NOTE — DISCHARGE INSTRUCTIONS
Follow-up with your orthopedic surgeon at [address] [place] [address]  Take prescribed medication as prescribed only for pain  Lidocaine patches only to be worn 12 hours/day. 12 hours on and 12 hours off before replacing with another patch as needed.

## 2023-07-27 NOTE — ED NOTES
D/C: Order noted for d/c. Pt confirmed d/c paperwork 3 paper scripts handed to patient  have correct name. Discharge and education instructions reviewed with patient. Teach-back successful. Pt verbalized understanding and signed d/c papers. Pt denied questions at this time. No acute distress noted. Patient instructed to follow-up as noted - return to emergency department if symptoms worsen. Patient verbalized understanding. Discharged per EDMD with discharge instructions. Pt discharged to private vehicle. Patient stable upon departure. Thanked patient for choosing [facility] for care. Provider aware of patient pain at time of discharge.        Alfie Tierney RN  07/27/23 8526

## 2023-10-24 ENCOUNTER — OFFICE VISIT (OUTPATIENT)
Dept: PRIMARY CARE CLINIC | Age: 38
End: 2023-10-24
Payer: COMMERCIAL

## 2023-10-24 VITALS
RESPIRATION RATE: 16 BRPM | WEIGHT: 160 LBS | HEART RATE: 75 BPM | SYSTOLIC BLOOD PRESSURE: 133 MMHG | BODY MASS INDEX: 31.25 KG/M2 | DIASTOLIC BLOOD PRESSURE: 87 MMHG

## 2023-10-24 DIAGNOSIS — M25.552 ACUTE HIP PAIN, LEFT: Primary | ICD-10-CM

## 2023-10-24 PROCEDURE — G8417 CALC BMI ABV UP PARAM F/U: HCPCS | Performed by: FAMILY MEDICINE

## 2023-10-24 PROCEDURE — G8484 FLU IMMUNIZE NO ADMIN: HCPCS | Performed by: FAMILY MEDICINE

## 2023-10-24 PROCEDURE — 1036F TOBACCO NON-USER: CPT | Performed by: FAMILY MEDICINE

## 2023-10-24 PROCEDURE — 99213 OFFICE O/P EST LOW 20 MIN: CPT | Performed by: FAMILY MEDICINE

## 2023-10-24 PROCEDURE — G8427 DOCREV CUR MEDS BY ELIG CLIN: HCPCS | Performed by: FAMILY MEDICINE

## 2023-10-24 RX ORDER — AMITRIPTYLINE HYDROCHLORIDE 10 MG/1
TABLET, FILM COATED ORAL PRN
COMMUNITY
Start: 2023-09-06 | End: 2024-03-04

## 2023-10-24 RX ORDER — TIZANIDINE HYDROCHLORIDE 2 MG/1
2 CAPSULE, GELATIN COATED ORAL 3 TIMES DAILY PRN
Qty: 30 CAPSULE | Refills: 1 | Status: SHIPPED | OUTPATIENT
Start: 2023-10-24

## 2023-10-24 RX ORDER — DICLOFENAC SODIUM 75 MG/1
75 TABLET, DELAYED RELEASE ORAL 2 TIMES DAILY
Qty: 60 TABLET | Refills: 3 | Status: SHIPPED | OUTPATIENT
Start: 2023-10-24

## 2023-10-24 SDOH — ECONOMIC STABILITY: FOOD INSECURITY: WITHIN THE PAST 12 MONTHS, THE FOOD YOU BOUGHT JUST DIDN'T LAST AND YOU DIDN'T HAVE MONEY TO GET MORE.: NEVER TRUE

## 2023-10-24 SDOH — ECONOMIC STABILITY: INCOME INSECURITY: HOW HARD IS IT FOR YOU TO PAY FOR THE VERY BASICS LIKE FOOD, HOUSING, MEDICAL CARE, AND HEATING?: NOT HARD AT ALL

## 2023-10-24 SDOH — ECONOMIC STABILITY: HOUSING INSECURITY
IN THE LAST 12 MONTHS, WAS THERE A TIME WHEN YOU DID NOT HAVE A STEADY PLACE TO SLEEP OR SLEPT IN A SHELTER (INCLUDING NOW)?: NO

## 2023-10-24 SDOH — ECONOMIC STABILITY: FOOD INSECURITY: WITHIN THE PAST 12 MONTHS, YOU WORRIED THAT YOUR FOOD WOULD RUN OUT BEFORE YOU GOT MONEY TO BUY MORE.: NEVER TRUE

## 2023-10-24 ASSESSMENT — ENCOUNTER SYMPTOMS
CONSTIPATION: 0
DIARRHEA: 0
SHORTNESS OF BREATH: 0
ABDOMINAL PAIN: 0
BLOOD IN STOOL: 0
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
## 2023-10-24 NOTE — PATIENT INSTRUCTIONS
payable at time of . Instead of the fee, you can choose to have the paperwork filled out during a separate office visit that is for filling out the paperwork only. Workman's Comp Claims: We do not handle workman's comp cases or claims. You will need to go to an urgent care to be seen or to whomever your employer uses. General - Any abusive/rude behavior toward staff/providers may be cause for dismissal.     If you are sick or need an appointment that hasn't been planned, same day appointments are available every day the office is open: Monday, Tuesday, Wednesday, Thursday, and Friday. Call during office hours to schedule, even if it may not be with your regular physician. You may also call the office after 8 am on office days if you need to be seen from an issue the night before. During hours when the office is not normally open, your call will go to the messaging service which cannot provide any service other than paging the doctor. No prescriptions or other nonurgent matters will be handled and no voicemail is available, so please call back during office hours for these matters.

## 2024-02-13 ENCOUNTER — OFFICE VISIT (OUTPATIENT)
Dept: PRIMARY CARE CLINIC | Age: 39
End: 2024-02-13
Payer: COMMERCIAL

## 2024-02-13 VITALS
RESPIRATION RATE: 18 BRPM | DIASTOLIC BLOOD PRESSURE: 84 MMHG | WEIGHT: 162 LBS | HEART RATE: 77 BPM | SYSTOLIC BLOOD PRESSURE: 141 MMHG | BODY MASS INDEX: 31.64 KG/M2

## 2024-02-13 DIAGNOSIS — E65 ABDOMINAL PANNUS: Primary | ICD-10-CM

## 2024-02-13 DIAGNOSIS — L50.9 URTICARIA: ICD-10-CM

## 2024-02-13 DIAGNOSIS — E66.9 OBESITY (BMI 30.0-34.9): ICD-10-CM

## 2024-02-13 PROBLEM — E66.811 OBESITY (BMI 30.0-34.9): Status: ACTIVE | Noted: 2024-02-13

## 2024-02-13 PROCEDURE — G8484 FLU IMMUNIZE NO ADMIN: HCPCS | Performed by: FAMILY MEDICINE

## 2024-02-13 PROCEDURE — G8427 DOCREV CUR MEDS BY ELIG CLIN: HCPCS | Performed by: FAMILY MEDICINE

## 2024-02-13 PROCEDURE — G8417 CALC BMI ABV UP PARAM F/U: HCPCS | Performed by: FAMILY MEDICINE

## 2024-02-13 PROCEDURE — 1036F TOBACCO NON-USER: CPT | Performed by: FAMILY MEDICINE

## 2024-02-13 PROCEDURE — 99213 OFFICE O/P EST LOW 20 MIN: CPT | Performed by: FAMILY MEDICINE

## 2024-02-13 NOTE — PROGRESS NOTES
2024     Lise Winter (:  1985) is a 38 y.o. female, here for evaluation of the following medical concerns:      Patient is 38 years old female obese with abdominal pannus and excessive the skin underneath both triceps presented to the office requesting referral to  plastic surgeon.  She also complained of itchiness on her body arms and legs presumably due to dry skin from cold weather.  She denies headache nausea vomiting.  Denies chest pain or shortness of breath.  Denies bowel or urinary disturbance.    Review of Systems   Constitutional:  Negative for activity change and appetite change.   Eyes:  Negative for visual disturbance.   Respiratory:  Negative for shortness of breath.    Cardiovascular:  Negative for chest pain and leg swelling.   Gastrointestinal:  Negative for abdominal pain, blood in stool, constipation and diarrhea.   Genitourinary:  Negative for difficulty urinating, frequency, hematuria, menstrual problem and urgency.   Neurological:  Negative for dizziness and syncope.   Psychiatric/Behavioral:  Negative for behavioral problems.        Prior to Visit Medications    Medication Sig Taking? Authorizing Provider   tiZANidine (ZANAFLEX) 2 MG capsule Take 1 capsule by mouth 3 times daily as needed for Muscle spasms May cause drowsiness. Yes Praneeth Coto MD   Siponimod Fumarate (MAYZENT) 2 MG TABS Take 1 tablet by mouth daily Yes Praneeth Coto MD   amitriptyline (ELAVIL) 10 MG tablet Take by mouth as needed  Patient not taking: Reported on 2024  ProviderJuju MD   diclofenac (VOLTAREN) 75 MG EC tablet Take 1 tablet by mouth 2 times daily  Patient not taking: Reported on 2024  Praneeth Coto MD   ibuprofen (ADVIL;MOTRIN) 600 MG tablet Take 1 tablet by mouth every 8 hours as needed for Pain  Patient not taking: Reported on 2024  Elan Viveros PA-C        Social History     Tobacco Use    Smoking status: Never    Smokeless tobacco: Never

## 2024-02-14 NOTE — PATIENT INSTRUCTIONS
GENERAL OFFICE POLICIES        Telephone Calls: Messages will be answered within 1-2 business days, unless the provider is out of the office.  If it is urgent a covering provider will answer. (this does not include Medication refills).      MyChart:  We recommend all patients sign up for [employer].  Through this portal you can see your lab results, request refills, schedule appointments, pay your bill and send messages to the office.   [employer] messages will be answered within 1-2 business days unless the provider is out of the office.  For urgent matters, please call the office.    Appointments:  All appointments must be scheduled.  We ask all patients to schedule their next follow up appointment before they leave the office to make sure you will be able to be seen before you run out of medications.  24 hours' notice is required to cancel or reschedule an appointment to avoid being marked as a no show.  You may be dismissed from the practice after 3 no shows.      LATE for Appointment: If you are 15 or more minutes late for your appointment, you may be asked to reschedule.    MA/LAB APPTS: Must be scheduled, cannot accept walk in lab visits.  We only draw labs for patients established in our office.  We only do injections for medications ordered by our office.    Acute Sick Visits:  Nothing other than acute complaint will be addressed at this visit.    TRADITIONAL MEDICARE DOES NOT COVER PHYSICALS  MEDICARE WELLNESS VISITS: These are NOT physicals, but the free annual visit offered by Medicare to discuss wellness issues. Medication refills, checkups, etc. will not be addressed during this visit.    Medication Refills: Refills are handled electronically; please contact your pharmacy for refills even if current refills have been exhausted. If you are on a controlled medication, you will be referred to a specialist (pain specialist, psychiatry, etc).     Forms: There is a $35 fee to fill out FMLA/Disability paperwork,

## 2024-03-21 ENCOUNTER — PATIENT MESSAGE (OUTPATIENT)
Dept: PRIMARY CARE CLINIC | Age: 39
End: 2024-03-21

## 2024-03-22 NOTE — TELEPHONE ENCOUNTER
From: Lise Winter  To: Dr. Praneeth Coto  Sent: 3/21/2024 4:22 PM EDT  Subject: Mri    Candelo. I was trying to schedule MRI at [facility] and they said I need a referral from you. Can you send one over I have been having really bad head aches daily and I knw I was suppose to been schedule one some where. So can you send referral asap. Or where should I try to go? Alot of places talking months away.

## 2024-03-22 NOTE — TELEPHONE ENCOUNTER
Dr Coto:   I don't see any MRI order in [facility].   Pt was here in February- but only documentation is about skin issues.  Nothing headache- related.     Has a Neurologist @ [facility]-  Dr Luigi Wood??

## 2024-04-04 ENCOUNTER — OFFICE VISIT (OUTPATIENT)
Dept: PRIMARY CARE CLINIC | Age: 39
End: 2024-04-04
Payer: COMMERCIAL

## 2024-04-04 VITALS
OXYGEN SATURATION: 99 % | SYSTOLIC BLOOD PRESSURE: 124 MMHG | HEART RATE: 70 BPM | BODY MASS INDEX: 30.74 KG/M2 | WEIGHT: 157.4 LBS | DIASTOLIC BLOOD PRESSURE: 78 MMHG

## 2024-04-04 DIAGNOSIS — N39.0 ACUTE UTI: Primary | ICD-10-CM

## 2024-04-04 DIAGNOSIS — E66.9 OBESITY (BMI 30.0-34.9): ICD-10-CM

## 2024-04-04 LAB
BILIRUBIN, POC: NEGATIVE
BLOOD URINE, POC: NEGATIVE
CLARITY, POC: ABNORMAL
COLOR, POC: ABNORMAL
GLUCOSE URINE, POC: NEGATIVE
KETONES, POC: NEGATIVE
LEUKOCYTE EST, POC: NEGATIVE
NITRITE, POC: POSITIVE
PH, POC: 5.5
PROTEIN, POC: NEGATIVE
SPECIFIC GRAVITY, POC: 1.03
UROBILINOGEN, POC: 1

## 2024-04-04 PROCEDURE — G8427 DOCREV CUR MEDS BY ELIG CLIN: HCPCS | Performed by: FAMILY MEDICINE

## 2024-04-04 PROCEDURE — 99213 OFFICE O/P EST LOW 20 MIN: CPT | Performed by: FAMILY MEDICINE

## 2024-04-04 PROCEDURE — G8417 CALC BMI ABV UP PARAM F/U: HCPCS | Performed by: FAMILY MEDICINE

## 2024-04-04 PROCEDURE — 1036F TOBACCO NON-USER: CPT | Performed by: FAMILY MEDICINE

## 2024-04-04 PROCEDURE — 81002 URINALYSIS NONAUTO W/O SCOPE: CPT | Performed by: FAMILY MEDICINE

## 2024-04-04 RX ORDER — CEPHALEXIN 500 MG/1
500 CAPSULE ORAL 3 TIMES DAILY
Qty: 21 CAPSULE | Refills: 1 | Status: SHIPPED | OUTPATIENT
Start: 2024-04-04

## 2024-04-04 ASSESSMENT — ENCOUNTER SYMPTOMS
SHORTNESS OF BREATH: 0
BLOOD IN STOOL: 0
CONSTIPATION: 0
ABDOMINAL PAIN: 0
DIARRHEA: 0

## 2024-04-04 NOTE — PROGRESS NOTES
2024     Lise Winter (:  1985) is a 38 y.o. female, here for evaluation of the following medical concerns:    Urinary Tract Infection  Pertinent negatives include no hematuria.     Patient is 38 years old female presented to the office for checkup.  She is obese was referred to  plastic surgery for possible surgery of the umbilical pannus but the was told she has to lose weight.  She presented to the office today due to possible UTI.  She reported drinking a lot of bowel soda and lately has been having frequent urination with darker foul-smelling urine.  She denies burning urination.  Denies fever and chills.  Denies abdominal or low back pain.    Review of Systems   Constitutional:  Negative for activity change and appetite change.   Eyes:  Negative for visual disturbance.   Respiratory:  Negative for shortness of breath.    Cardiovascular:  Negative for chest pain and leg swelling.   Gastrointestinal:  Negative for abdominal pain, blood in stool, constipation and diarrhea.   Genitourinary:  Positive for frequency. Negative for difficulty urinating, hematuria, menstrual problem and urgency.   Neurological:  Negative for dizziness and syncope.   Psychiatric/Behavioral:  Negative for behavioral problems.        Prior to Visit Medications    Medication Sig Taking? Authorizing Provider   cephALEXin (KEFLEX) 500 MG capsule Take 1 capsule by mouth 3 times daily Yes Praneeth Coto MD   amitriptyline (ELAVIL) 10 MG tablet Take by mouth as needed  Patient not taking: Reported on 2024  ProviderJuju MD   diclofenac (VOLTAREN) 75 MG EC tablet Take 1 tablet by mouth 2 times daily  Patient not taking: Reported on 2024  Praneeth Coto MD   tiZANidine (ZANAFLEX) 2 MG capsule Take 1 capsule by mouth 3 times daily as needed for Muscle spasms May cause drowsiness.  Praneeth Coto MD   ibuprofen (ADVIL;MOTRIN) 600 MG tablet Take 1 tablet by mouth every 8 hours as needed for

## 2024-04-04 NOTE — PATIENT INSTRUCTIONS
GENERAL OFFICE POLICIES        Telephone Calls: Messages will be answered within 1-2 business days, unless the provider is out of the office.  If it is urgent a covering provider will answer. (this does not include Medication refills).      MyChart:  We recommend all patients sign up for [employer].  Through this portal you can see your lab results, request refills, schedule appointments, pay your bill and send messages to the office.   [employer] messages will be answered within 1-2 business days unless the provider is out of the office.  For urgent matters, please call the office.    Appointments:  All appointments must be scheduled.  We ask all patients to schedule their next follow up appointment before they leave the office to make sure you will be able to be seen before you run out of medications.  24 hours' notice is required to cancel or reschedule an appointment to avoid being marked as a no show.  You may be dismissed from the practice after 3 no shows.      LATE for Appointment: If you are 15 or more minutes late for your appointment, you may be asked to reschedule.    MA/LAB APPTS: Must be scheduled, cannot accept walk in lab visits.  We only draw labs for patients established in our office.  We only do injections for medications ordered by our office.    Acute Sick Visits:  Nothing other than acute complaint will be addressed at this visit.    TRADITIONAL MEDICARE DOES NOT COVER PHYSICALS  MEDICARE WELLNESS VISITS: These are NOT physicals, but the free annual visit offered by Medicare to discuss wellness issues. Medication refills, checkups, etc. will not be addressed during this visit.    Medication Refills: Refills are handled electronically; please contact your pharmacy for refills even if current refills have been exhausted. If you are on a controlled medication, you will be referred to a specialist (pain specialist, psychiatry, etc).     Forms: There is a $35 fee to fill out FMLA/Disability paperwork,

## 2025-02-19 ENCOUNTER — OFFICE VISIT (OUTPATIENT)
Age: 40
End: 2025-02-19

## 2025-02-19 VITALS
WEIGHT: 167.4 LBS | DIASTOLIC BLOOD PRESSURE: 89 MMHG | OXYGEN SATURATION: 98 % | TEMPERATURE: 97.1 F | SYSTOLIC BLOOD PRESSURE: 140 MMHG | BODY MASS INDEX: 32.86 KG/M2 | HEART RATE: 70 BPM | HEIGHT: 60 IN

## 2025-02-19 DIAGNOSIS — R35.0 FREQUENCY OF URINATION: Primary | ICD-10-CM

## 2025-02-19 LAB
BILIRUBIN, POC: ABNORMAL
BLOOD URINE, POC: ABNORMAL
CLARITY, POC: ABNORMAL
COLOR, POC: YELLOW
GLUCOSE URINE, POC: ABNORMAL MG/DL
KETONES, POC: ABNORMAL MG/DL
LEUKOCYTE EST, POC: ABNORMAL
NITRITE, POC: ABNORMAL
PH, POC: 6.5
PROTEIN, POC: ABNORMAL MG/DL
SPECIFIC GRAVITY, POC: 1.02
UROBILINOGEN, POC: 2 MG/DL

## 2025-02-19 RX ORDER — NITROFURANTOIN 25; 75 MG/1; MG/1
100 CAPSULE ORAL 2 TIMES DAILY
Qty: 20 CAPSULE | Refills: 0 | Status: SHIPPED | OUTPATIENT
Start: 2025-02-19 | End: 2025-03-01

## 2025-02-19 NOTE — PATIENT INSTRUCTIONS
Lise,    Thank you for trusting [facility] with your health care needs. Your decision to come to us means a lot, and we are honored to be part of your healthcare journey.  At [facility], our dedicated team is committed to providing you with the highest quality of care in a warm and welcoming environment. Your health and well-being are our top priorities, and we appreciate the opportunity to serve you.    Thank you for choosing us, and we’re here for you whenever you need us!    Warm regards,       [facility] Team    [] Dr. Hairston [] SOBEIDA Meier, Supervisor       [] LATHA Braxton    [] RT Frannie    [] SOBEIDA Atkins    [] SOBEIDA Gomez   [] SOBEIDA Dempsey

## 2025-02-19 NOTE — PROGRESS NOTES
Lise Winter (: 1985) is a 39 y.o. female, New patient, here for evaluation of the following chief complaint(s):  Urinary Frequency (Started couple days)      ASSESSMENT/PLAN:    ICD-10-CM    1. Frequency of urination  R35.0 POCT Urinalysis no Micro     Culture, Urine     nitrofurantoin, macrocrystal-monohydrate, (MACROBID) 100 MG capsule            Discussed PCP follow up for persisting or worsening symptoms, or to return to the clinic if unable to obtain PCP follow up for worsening symptoms.    The patient tolerated their visit well. The patient and/or the family were informed of the results of any tests, a time was given to answer questions, a plan was proposed and they agreed with plan. Reviewed AVS with treatment instructions and answered questions - pt/family expresses understanding and agreement with the discussed treatment plan and AVS instructions.      SUBJECTIVE/OBJECTIVE:  HPI     Urinary Frequency     Additional comments: Started couple days          Last edited by Marcella Maki on 2025 10:08 AM.            Urinary Frequency   Associated symptoms include frequency.       VITAL SIGNS  Vitals:    25 1008   BP: (!) 140/89   Site: Right Upper Arm   Position: Sitting   Cuff Size: Large Adult   Pulse: 70   Temp: 97.1 °F (36.2 °C)   TempSrc: Oral   SpO2: 98%   Weight: 75.9 kg (167 lb 6.4 oz)   Height: 1.524 m (5')       Review of Systems   Genitourinary:  Positive for frequency.     See HPI for pertinent positives and negatives.    Physical Exam  Constitutional:       General: She is not in acute distress.     Appearance: Normal appearance.   HENT:      Head: Normocephalic and atraumatic.      Right Ear: Tympanic membrane and ear canal normal.      Left Ear: Tympanic membrane and ear canal normal.      Nose: Nose normal.      Mouth/Throat:      Mouth: Mucous membranes are moist.   Eyes:      Pupils: Pupils are equal, round, and reactive to light.   Cardiovascular:

## 2025-02-21 LAB
BACTERIA UR CULT: ABNORMAL
ORGANISM: ABNORMAL

## 2025-03-06 ASSESSMENT — PATIENT HEALTH QUESTIONNAIRE - PHQ9
1. LITTLE INTEREST OR PLEASURE IN DOING THINGS: NOT AT ALL
SUM OF ALL RESPONSES TO PHQ QUESTIONS 1-9: 0
SUM OF ALL RESPONSES TO PHQ9 QUESTIONS 1 & 2: 0
SUM OF ALL RESPONSES TO PHQ QUESTIONS 1-9: 0
SUM OF ALL RESPONSES TO PHQ QUESTIONS 1-9: 0
2. FEELING DOWN, DEPRESSED OR HOPELESS: NOT AT ALL
SUM OF ALL RESPONSES TO PHQ QUESTIONS 1-9: 0
2. FEELING DOWN, DEPRESSED OR HOPELESS: NOT AT ALL
1. LITTLE INTEREST OR PLEASURE IN DOING THINGS: NOT AT ALL

## 2025-03-07 ENCOUNTER — OFFICE VISIT (OUTPATIENT)
Dept: PRIMARY CARE CLINIC | Age: 40
End: 2025-03-07

## 2025-03-07 VITALS
WEIGHT: 167 LBS | DIASTOLIC BLOOD PRESSURE: 92 MMHG | HEIGHT: 63 IN | HEART RATE: 70 BPM | SYSTOLIC BLOOD PRESSURE: 145 MMHG | BODY MASS INDEX: 29.59 KG/M2 | RESPIRATION RATE: 18 BRPM

## 2025-03-07 DIAGNOSIS — R73.09 ELEVATED GLUCOSE: ICD-10-CM

## 2025-03-07 DIAGNOSIS — Z13.220 LIPID SCREENING: ICD-10-CM

## 2025-03-07 DIAGNOSIS — G44.229 CHRONIC TENSION-TYPE HEADACHE, NOT INTRACTABLE: ICD-10-CM

## 2025-03-07 DIAGNOSIS — E66.811 OBESITY (BMI 30.0-34.9): Primary | ICD-10-CM

## 2025-03-07 DIAGNOSIS — Z23 NEED FOR INFLUENZA VACCINATION: ICD-10-CM

## 2025-03-07 DIAGNOSIS — R03.0 ELEVATED BP WITHOUT DIAGNOSIS OF HYPERTENSION: ICD-10-CM

## 2025-03-07 DIAGNOSIS — E55.9 VITAMIN D DEFICIENCY: ICD-10-CM

## 2025-03-07 DIAGNOSIS — Z13.29 THYROID DISORDER SCREENING: ICD-10-CM

## 2025-03-07 DIAGNOSIS — Z13.0 SCREENING FOR DEFICIENCY ANEMIA: ICD-10-CM

## 2025-03-07 DIAGNOSIS — G35 RELAPSING REMITTING MULTIPLE SCLEROSIS (HCC): ICD-10-CM

## 2025-03-07 LAB
25(OH)D3 SERPL-MCNC: 12.1 NG/ML
ALBUMIN SERPL-MCNC: 4.6 G/DL (ref 3.4–5)
ALBUMIN/GLOB SERPL: 1.9 {RATIO} (ref 1.1–2.2)
ALP SERPL-CCNC: 94 U/L (ref 40–129)
ALT SERPL-CCNC: 74 U/L (ref 10–40)
ANION GAP SERPL CALCULATED.3IONS-SCNC: 11 MMOL/L (ref 3–16)
AST SERPL-CCNC: 52 U/L (ref 15–37)
BASOPHILS # BLD: 0 K/UL (ref 0–0.2)
BASOPHILS NFR BLD: 0.3 %
BILIRUB SERPL-MCNC: <0.2 MG/DL (ref 0–1)
BUN SERPL-MCNC: 15 MG/DL (ref 7–20)
CALCIUM SERPL-MCNC: 10.1 MG/DL (ref 8.3–10.6)
CHLORIDE SERPL-SCNC: 103 MMOL/L (ref 99–110)
CHOLEST SERPL-MCNC: 229 MG/DL (ref 0–199)
CO2 SERPL-SCNC: 25 MMOL/L (ref 21–32)
CREAT SERPL-MCNC: 0.7 MG/DL (ref 0.6–1.1)
DEPRECATED RDW RBC AUTO: 13.1 % (ref 12.4–15.4)
EOSINOPHIL # BLD: 0.2 K/UL (ref 0–0.6)
EOSINOPHIL NFR BLD: 3.1 %
GFR SERPLBLD CREATININE-BSD FMLA CKD-EPI: >90 ML/MIN/{1.73_M2}
GLUCOSE P FAST SERPL-MCNC: 76 MG/DL (ref 70–99)
HCT VFR BLD AUTO: 39.7 % (ref 36–48)
HDLC SERPL-MCNC: 91 MG/DL (ref 40–60)
HGB BLD-MCNC: 13.2 G/DL (ref 12–16)
LDL CHOLESTEROL: 127 MG/DL
LYMPHOCYTES # BLD: 0.5 K/UL (ref 1–5.1)
LYMPHOCYTES NFR BLD: 7.4 %
MCH RBC QN AUTO: 29.9 PG (ref 26–34)
MCHC RBC AUTO-ENTMCNC: 33.3 G/DL (ref 31–36)
MCV RBC AUTO: 89.8 FL (ref 80–100)
MONOCYTES # BLD: 0.7 K/UL (ref 0–1.3)
MONOCYTES NFR BLD: 10.7 %
NEUTROPHILS # BLD: 5 K/UL (ref 1.7–7.7)
NEUTROPHILS NFR BLD: 78.5 %
PLATELET # BLD AUTO: 309 K/UL (ref 135–450)
PMV BLD AUTO: 9.1 FL (ref 5–10.5)
POTASSIUM SERPL-SCNC: 4.3 MMOL/L (ref 3.5–5.1)
PROT SERPL-MCNC: 7 G/DL (ref 6.4–8.2)
RBC # BLD AUTO: 4.42 M/UL (ref 4–5.2)
SODIUM SERPL-SCNC: 139 MMOL/L (ref 136–145)
T4 FREE SERPL-MCNC: 0.9 NG/DL (ref 0.9–1.8)
TRIGL SERPL-MCNC: 53 MG/DL (ref 0–150)
TSH SERPL DL<=0.005 MIU/L-ACNC: 0.56 UIU/ML (ref 0.27–4.2)
VLDLC SERPL CALC-MCNC: 11 MG/DL
WBC # BLD AUTO: 6.3 K/UL (ref 4–11)

## 2025-03-07 RX ORDER — RIZATRIPTAN BENZOATE 5 MG/1
5 TABLET, ORALLY DISINTEGRATING ORAL PRN
COMMUNITY
Start: 2024-12-03

## 2025-03-07 ASSESSMENT — ENCOUNTER SYMPTOMS
CONSTIPATION: 0
DIARRHEA: 0
BLOOD IN STOOL: 0
ABDOMINAL PAIN: 0
SHORTNESS OF BREATH: 0

## 2025-03-07 NOTE — PROGRESS NOTES
needed    4. Vitamin D deficiency  Advised to take over-the-counter vitamin D supplement 2000 units daily  - Vitamin D 25 Hydroxy    5. Elevated glucose  Will check fasting blood sugar and HbA1c.  - Comprehensive Metabolic Panel, Fasting  - Hemoglobin A1C    6. Lipid screening  - Lipid, Fasting    7. Thyroid disorder screening  - T4, Free  - TSH    8. Screening for deficiency anemia  - CBC with Auto Differential    9. Need for influenza vaccination  - Influenza, FLUCELVAX Trivalent, (age 6 mo+) IM, Preservative Free, 0.5mL      RTC PRN    An electronic signature was used to authenticate this note.    --NICK BAUER MD on 3/7/2025 at 11:01 AM

## 2025-03-08 LAB
EST. AVERAGE GLUCOSE BLD GHB EST-MCNC: 102.5 MG/DL
HBA1C MFR BLD: 5.2 %

## 2025-03-11 ENCOUNTER — TELEPHONE (OUTPATIENT)
Dept: ADMINISTRATIVE | Age: 40
End: 2025-03-11

## 2025-03-11 NOTE — TELEPHONE ENCOUNTER
Submitted PA for Ozempic (0.25 or 0.5 MG/DOSE) 2MG/3ML pen-injectors  Via CMM (Key: [id number]) STATUS: PENDING.    Follow up done daily; if no decision with in three days we will refax.  If another three days goes by with no decision will call the insurance for status.

## 2025-03-12 NOTE — TELEPHONE ENCOUNTER
The medication was DENIED; DENIAL letter is uploaded to MEDIA.    Generic Denial: Drug is not covered for off label usage.  This drug is FDA approved for : TYPE 2 DIABETES MELLITUS      If you want an APPEAL; please note in this encounter what new information you would like to APPEAL with.  Once complete route back to PA POOL.    If this requires a response please respond to the pool ( P MHCX P[facility] MEDICATION PRE-AUTH).      Thank you please advise patient.

## 2025-06-24 ENCOUNTER — OFFICE VISIT (OUTPATIENT)
Age: 40
End: 2025-06-24

## 2025-06-24 VITALS
HEIGHT: 60 IN | DIASTOLIC BLOOD PRESSURE: 95 MMHG | SYSTOLIC BLOOD PRESSURE: 145 MMHG | HEART RATE: 73 BPM | BODY MASS INDEX: 31.77 KG/M2 | OXYGEN SATURATION: 99 % | TEMPERATURE: 98.3 F | WEIGHT: 161.8 LBS

## 2025-06-24 DIAGNOSIS — Z20.2 POSSIBLE EXPOSURE TO STD: Primary | ICD-10-CM

## 2025-06-24 DIAGNOSIS — R03.0 ELEVATED BP WITHOUT DIAGNOSIS OF HYPERTENSION: ICD-10-CM

## 2025-06-24 PROBLEM — G35: Status: ACTIVE | Noted: 2022-10-26

## 2025-06-24 PROBLEM — G35: Status: RESOLVED | Noted: 2022-10-26 | Resolved: 2025-06-24

## 2025-06-24 PROBLEM — G44.219 EPISODIC TENSION-TYPE HEADACHE, NOT INTRACTABLE: Status: ACTIVE | Noted: 2022-10-26

## 2025-06-24 PROBLEM — M54.50 CHRONIC BILATERAL LOW BACK PAIN WITHOUT SCIATICA: Status: ACTIVE | Noted: 2023-04-21

## 2025-06-24 PROBLEM — G89.29 CHRONIC BILATERAL LOW BACK PAIN WITHOUT SCIATICA: Status: ACTIVE | Noted: 2023-04-21

## 2025-06-24 NOTE — PROGRESS NOTES
Lise Winter (:  1985) is a 39 y.o. female,Established patient, here for evaluation of the following chief complaint(s):  Sexually Transmitted Diseases (No symptoms, just wants testing for STDs)    Assessment & Plan :  Visit Diagnoses and Associated Orders         Possible exposure to STD    -  Primary    VAGINITIS PANEL PCR [[id number] Custom]      C.trachomatis N.gonorrhoeae DNA, Urine [[id number] Custom]             Elevated BP without diagnosis of hypertension        Amb Referral to Primary Care [[id number] Custom]                 Patient was seen and evaluated today for wanting checked for STDs.  She is not having any symptoms at this time.  Will go ahead and send off an STI and vaginitis panel.  We will augment treatment based on any positive results.  Return to office and ER precautions given.   Follow up in 5-7 days if symptoms persist or if symptoms worsen.       Subjective :  HPI     39 y.o. female presents with symptoms of wanting to be checked for STDs.  She denies any symptoms.  She denies fever, chills, weakness, dizziness, headaches, chest pain, shortness of breath or abdominal pain.      Vitals:    25 1254 25 1305   BP: (!) 146/95 (!) 145/95   BP Site: Left Upper Arm    Patient Position: Sitting    BP Cuff Size: Medium Adult    Pulse: 73    Temp: 98.3 °F (36.8 °C)    SpO2: 99%    Weight: 73.4 kg (161 lb 12.8 oz)    Height: 1.524 m (5')        No results found for this visit on 25.      Objective   Physical Exam  Constitutional:       Appearance: Normal appearance.   HENT:      Head: Normocephalic.   Eyes:      Extraocular Movements: Extraocular movements intact.   Cardiovascular:      Rate and Rhythm: Normal rate and regular rhythm.   Pulmonary:      Effort: Pulmonary effort is normal.      Breath sounds: Normal breath sounds.   Abdominal:      Palpations: Abdomen is soft.      Tenderness: There is no abdominal tenderness.   Skin:     General: Skin is warm and dry.

## 2025-06-25 LAB
C TRACH DNA UR QL NAA+PROBE: NEGATIVE
N GONORRHOEA DNA UR QL NAA+PROBE: NEGATIVE

## 2025-06-26 ENCOUNTER — RESULTS FOLLOW-UP (OUTPATIENT)
Age: 40
End: 2025-06-26

## 2025-06-26 LAB
BV BACTERIA DNA VAG QL NAA+PROBE: DETECTED
C GLABRATA DNA VAG QL NAA+PROBE: ABNORMAL
C GLABRATA DNA VAG QL NAA+PROBE: NOT DETECTED
C KRUSEI DNA VAG QL NAA+PROBE: NOT DETECTED
CANDIDA DNA VAG QL NAA+PROBE: DETECTED
T VAGINALIS DNA VAG QL NAA+PROBE: DETECTED

## 2025-06-26 RX ORDER — FLUCONAZOLE 150 MG/1
150 TABLET ORAL WEEKLY
Qty: 2 TABLET | Refills: 0 | Status: SHIPPED | OUTPATIENT
Start: 2025-06-26 | End: 2025-07-04

## 2025-06-26 RX ORDER — METRONIDAZOLE 500 MG/1
500 TABLET ORAL 2 TIMES DAILY
Qty: 14 TABLET | Refills: 0 | Status: SHIPPED | OUTPATIENT
Start: 2025-06-26 | End: 2025-07-03

## 2025-08-02 ENCOUNTER — OFFICE VISIT (OUTPATIENT)
Age: 40
End: 2025-08-02

## 2025-08-02 VITALS
DIASTOLIC BLOOD PRESSURE: 74 MMHG | BODY MASS INDEX: 31.22 KG/M2 | OXYGEN SATURATION: 98 % | HEART RATE: 80 BPM | HEIGHT: 60 IN | TEMPERATURE: 98.6 F | SYSTOLIC BLOOD PRESSURE: 128 MMHG | WEIGHT: 159 LBS

## 2025-08-02 DIAGNOSIS — N39.0 URINARY TRACT INFECTION WITHOUT HEMATURIA, SITE UNSPECIFIED: ICD-10-CM

## 2025-08-02 DIAGNOSIS — Z11.3 SCREEN FOR STD (SEXUALLY TRANSMITTED DISEASE): ICD-10-CM

## 2025-08-02 DIAGNOSIS — R35.0 INCREASED URINARY FREQUENCY: Primary | ICD-10-CM

## 2025-08-02 LAB
BILIRUBIN, POC: NORMAL
BLOOD URINE, POC: NORMAL
CLARITY, POC: NORMAL
COLOR, POC: NORMAL
GLUCOSE URINE, POC: NORMAL MG/DL
KETONES, POC: NORMAL MG/DL
LEUKOCYTE EST, POC: NORMAL
NITRITE, POC: NORMAL
PH, POC: 6
PROTEIN, POC: NORMAL MG/DL
SPECIFIC GRAVITY, POC: 1.02
UROBILINOGEN, POC: 1 MG/DL

## 2025-08-02 RX ORDER — SULFAMETHOXAZOLE AND TRIMETHOPRIM 200; 40 MG/5ML; MG/5ML
SUSPENSION ORAL
Qty: 280 ML | Refills: 0 | Status: SHIPPED | OUTPATIENT
Start: 2025-08-02

## 2025-08-02 ASSESSMENT — ENCOUNTER SYMPTOMS
VOMITING: 0
ABDOMINAL PAIN: 0
NAUSEA: 0
SORE THROAT: 0
COUGH: 0

## 2025-08-02 NOTE — PROGRESS NOTES
Lise Winter (:  1985) is a 39 y.o. female,Established patient, here for evaluation of the following chief complaint(s):  Urinary Frequency (Urinary freq xthis am)      ASSESSMENT/PLAN:  1. Increased urinary frequency    - POCT Urinalysis no Micro    2. Screen for STD (sexually transmitted disease)    - Vaginitis Panel PCR; Future  - C.trachomatis N.gonorrhoeae DNA, Urine ([facility] Only); Future    3. Urinary tract infection without hematuria, site unspecified    - sulfamethoxazole-trimethoprim (BACTRIM;SEPTRA) 200-40 MG/5ML suspension; 20 ml po bid for 7 days  Dispense: 280 mL; Refill: 0     -instruction in AVS  Return if symptoms worsen or fail to improve.    SUBJECTIVE/OBJECTIVE:  Pt wants std testing      History provided by:  Patient  Urinary Frequency   This is a new problem. The current episode started today. The problem has been unchanged. The patient is experiencing no pain. She is Sexually active. There is No history of pyelonephritis. Associated symptoms include frequency and urgency. Pertinent negatives include no chills, discharge, flank pain, hematuria, nausea, possible pregnancy, sweats or vomiting.       Vitals:    25 1026   BP: 128/74   BP Site: Left Upper Arm   Patient Position: Sitting   BP Cuff Size: Medium Adult   Pulse: 80   Temp: 98.6 °F (37 °C)   TempSrc: Oral   SpO2: 98%   Weight: 72.1 kg (159 lb)   Height: 1.524 m (5')       Review of Systems   Constitutional:  Negative for activity change, appetite change and chills.   HENT:  Negative for sore throat.    Respiratory:  Negative for cough.    Gastrointestinal:  Negative for abdominal pain, nausea and vomiting.   Genitourinary:  Positive for frequency and urgency. Negative for difficulty urinating, dysuria, flank pain, hematuria, vaginal bleeding and vaginal discharge.   Skin:  Negative for rash.       Physical Exam  Constitutional:       General: She is not in acute distress.  HENT:      Mouth/Throat:      Mouth:

## 2025-08-03 LAB
BV BACTERIA DNA VAG QL NAA+PROBE: DETECTED
C GLABRATA DNA VAG QL NAA+PROBE: ABNORMAL
C GLABRATA DNA VAG QL NAA+PROBE: NOT DETECTED
C KRUSEI DNA VAG QL NAA+PROBE: NOT DETECTED
CANDIDA DNA VAG QL NAA+PROBE: NOT DETECTED
T VAGINALIS DNA VAG QL NAA+PROBE: NOT DETECTED

## 2025-08-04 LAB
C TRACH DNA UR QL NAA+PROBE: NEGATIVE
N GONORRHOEA DNA UR QL NAA+PROBE: NEGATIVE